# Patient Record
Sex: MALE | Race: WHITE | NOT HISPANIC OR LATINO
[De-identification: names, ages, dates, MRNs, and addresses within clinical notes are randomized per-mention and may not be internally consistent; named-entity substitution may affect disease eponyms.]

---

## 2018-08-06 ENCOUNTER — APPOINTMENT (OUTPATIENT)
Dept: UROLOGY | Facility: CLINIC | Age: 63
End: 2018-08-06
Payer: MEDICAID

## 2018-08-06 VITALS
WEIGHT: 155 LBS | DIASTOLIC BLOOD PRESSURE: 74 MMHG | HEART RATE: 77 BPM | RESPIRATION RATE: 17 BRPM | HEIGHT: 71 IN | TEMPERATURE: 98.1 F | BODY MASS INDEX: 21.7 KG/M2 | SYSTOLIC BLOOD PRESSURE: 108 MMHG

## 2018-08-06 PROCEDURE — 99203 OFFICE O/P NEW LOW 30 MIN: CPT

## 2018-08-10 ENCOUNTER — MESSAGE (OUTPATIENT)
Age: 63
End: 2018-08-10

## 2018-08-10 LAB
PSA FREE FLD-MCNC: 25.9
PSA FREE SERPL-MCNC: 2.37 NG/ML
PSA SERPL-MCNC: 9.15 NG/ML

## 2018-08-29 ENCOUNTER — APPOINTMENT (OUTPATIENT)
Dept: UROLOGY | Facility: CLINIC | Age: 63
End: 2018-08-29
Payer: MEDICAID

## 2018-08-29 ENCOUNTER — OUTPATIENT (OUTPATIENT)
Dept: OUTPATIENT SERVICES | Facility: HOSPITAL | Age: 63
LOS: 1 days | End: 2018-08-29
Payer: MEDICAID

## 2018-08-29 VITALS
HEART RATE: 60 BPM | DIASTOLIC BLOOD PRESSURE: 61 MMHG | RESPIRATION RATE: 17 BRPM | SYSTOLIC BLOOD PRESSURE: 106 MMHG | TEMPERATURE: 98.2 F

## 2018-08-29 DIAGNOSIS — R35.0 FREQUENCY OF MICTURITION: ICD-10-CM

## 2018-08-29 PROCEDURE — 55700: CPT

## 2018-08-29 PROCEDURE — 76942 ECHO GUIDE FOR BIOPSY: CPT | Mod: 59

## 2018-08-29 PROCEDURE — 76872 US TRANSRECTAL: CPT | Mod: 26

## 2018-08-29 PROCEDURE — 76872 US TRANSRECTAL: CPT

## 2018-08-29 PROCEDURE — 76942 ECHO GUIDE FOR BIOPSY: CPT | Mod: 26,59

## 2018-08-29 RX ORDER — CIPROFLOXACIN HYDROCHLORIDE 500 MG/1
500 TABLET, FILM COATED ORAL TWICE DAILY
Qty: 6 | Refills: 0 | Status: COMPLETED | COMMUNITY
Start: 2018-08-10 | End: 2018-08-29

## 2018-08-30 DIAGNOSIS — R97.20 ELEVATED PROSTATE SPECIFIC ANTIGEN [PSA]: ICD-10-CM

## 2018-09-05 ENCOUNTER — APPOINTMENT (OUTPATIENT)
Dept: UROLOGY | Facility: CLINIC | Age: 63
End: 2018-09-05
Payer: MEDICAID

## 2018-09-05 DIAGNOSIS — R97.20 ELEVATED PROSTATE, SPECIFIC ANTIGEN [PSA]: ICD-10-CM

## 2018-09-05 LAB — CORE LAB BIOPSY: NORMAL

## 2018-09-05 PROCEDURE — 99214 OFFICE O/P EST MOD 30 MIN: CPT

## 2019-01-07 ENCOUNTER — APPOINTMENT (OUTPATIENT)
Dept: UROLOGY | Facility: CLINIC | Age: 64
End: 2019-01-07
Payer: MEDICAID

## 2019-01-07 PROCEDURE — 99213 OFFICE O/P EST LOW 20 MIN: CPT

## 2019-01-08 LAB
PSA FREE FLD-MCNC: 16 %
PSA FREE SERPL-MCNC: 0.89 NG/ML
PSA SERPL-MCNC: 5.48 NG/ML

## 2019-04-08 ENCOUNTER — APPOINTMENT (OUTPATIENT)
Dept: UROLOGY | Facility: CLINIC | Age: 64
End: 2019-04-08
Payer: MEDICAID

## 2019-04-08 PROCEDURE — 99214 OFFICE O/P EST MOD 30 MIN: CPT

## 2019-04-08 NOTE — ASSESSMENT
[FreeTextEntry1] : Low risk prostate cancer on active surveillance. \par --PSA today\par --RTC in 3-4mo with PSA and MARK\par --rebiopsy in 12-18mo with MRI prior to rebiopsy

## 2019-04-08 NOTE — PHYSICAL EXAM
[General Appearance - Well Developed] : well developed [General Appearance - Well Nourished] : well nourished [General Appearance - In No Acute Distress] : no acute distress [Edema] : no peripheral edema [Exaggerated Use Of Accessory Muscles For Inspiration] : no accessory muscle use [Normal Station and Gait] : the gait and station were normal for the patient's age [No Focal Deficits] : no focal deficits [Oriented To Time, Place, And Person] : oriented to person, place, and time [FreeTextEntry1] : B ears with skin changes

## 2019-04-08 NOTE — DISEASE MANAGEMENT
[1] : T1 [b] : b [X] : MX [0-10] : 0 -10 ng/mL [6] : [place] Score 6 [I] : I [Active Surveillance] : Active Surveillance [BiopsyDate] : 8/18 [MeasuredProstateVolume] : 23 [TotalCores] : 12 [TotalPositiveCores] : 2 [MaxCoreInvolvement] : 80

## 2019-04-08 NOTE — HISTORY OF PRESENT ILLNESS
[FreeTextEntry1] : 64yo gentleman with cc of prostate ca. Pt originally referred for elevated PSA after recent visit to PCP for annual exam and was found to have elevated PSA at 5.1. He had not seen PCP in many years. In 2014 PSA was 2.8. At baseline, no urinary complaints. Nocturia is 2-3x  but this is not bothersome and not consistent. No hx of UTI. No hematuria. No family hx of prostate ca. Father passed away from thyroid ca. Mom passed away from lung ca. Son with lung ca.  Repeat PSA here was 9.2 and bx recommended. Had bx in 8/2018 that showed 2 cores with prognostic grade 1 prostate ca involving 20% and 80% of the 2 cores. he was placed on active surveillance and returns today for follow-up. \par \par PSA at last visit was 5.48. Today he denies changes. No new issues.

## 2019-04-08 NOTE — DISEASE MANAGEMENT
[1] : T1 [b] : b [X] : MX [0-10] : 0 -10 ng/mL [6] : [place] Score 6 [I] : I [Active Surveillance] : Active Surveillance [BiopsyDate] : 8/18 [MeasuredProstateVolume] : 23 [TotalCores] : 12 [TotalPositiveCores] : 2 [MaxCoreInvolvement] : 80

## 2019-04-11 LAB
PSA FREE FLD-MCNC: 17 %
PSA FREE SERPL-MCNC: 0.98 NG/ML
PSA SERPL-MCNC: 5.7 NG/ML

## 2019-04-23 ENCOUNTER — APPOINTMENT (OUTPATIENT)
Dept: CARDIOLOGY | Facility: CLINIC | Age: 64
End: 2019-04-23
Payer: MEDICAID

## 2019-04-23 ENCOUNTER — NON-APPOINTMENT (OUTPATIENT)
Age: 64
End: 2019-04-23

## 2019-04-23 VITALS
HEART RATE: 66 BPM | HEIGHT: 71 IN | SYSTOLIC BLOOD PRESSURE: 116 MMHG | DIASTOLIC BLOOD PRESSURE: 70 MMHG | WEIGHT: 156 LBS | BODY MASS INDEX: 21.84 KG/M2 | OXYGEN SATURATION: 97 %

## 2019-04-23 DIAGNOSIS — I42.8 OTHER CARDIOMYOPATHIES: ICD-10-CM

## 2019-04-23 DIAGNOSIS — H81.10 BENIGN PAROXYSMAL VERTIGO, UNSPECIFIED EAR: ICD-10-CM

## 2019-04-23 PROCEDURE — 99203 OFFICE O/P NEW LOW 30 MIN: CPT

## 2019-04-23 PROCEDURE — 93000 ELECTROCARDIOGRAM COMPLETE: CPT

## 2019-04-23 NOTE — DISCUSSION/SUMMARY
[Stable] : stable [Non-specific ECG Changes] : abnormal ECG [Echocardiogram] : an echocardiogram [Stress Test Treadmill] : an exercise treadmill test [None] : There are no changes in medication management [Patient] : the patient [With ___] : with [unfilled] [FreeTextEntry1] : Like the patient suffered from benign positional vertigo it is now resolved. No evidence of any active cardiac issues present. The patient otherwise healthy and no other focal complaints noted. If dizziness recurs, suggest ENT evaluation as needed.

## 2019-04-23 NOTE — CARDIOLOGY SUMMARY
[No Exercise Ind Arr] : no exercise induced arrhythmias [No Symptoms] : no Symptoms [___] : [unfilled] [LVEF ___%] : LVEF [unfilled]%

## 2019-04-23 NOTE — REASON FOR VISIT
[Initial Evaluation] : an initial evaluation of [Abnormal ECG] : an abnormal ECG [Cardiomyopathy] : cardiomyopathy

## 2019-04-23 NOTE — PHYSICAL EXAM
[General Appearance - Well Developed] : well developed [Normal Appearance] : normal appearance [Well Groomed] : well groomed [General Appearance - Well Nourished] : well nourished [No Deformities] : no deformities [General Appearance - In No Acute Distress] : no acute distress [Normal Conjunctiva] : the conjunctiva exhibited no abnormalities [Eyelids - No Xanthelasma] : the eyelids demonstrated no xanthelasmas [No Oral Pallor] : no oral pallor [Normal Oral Mucosa] : normal oral mucosa [Normal Jugular Venous A Waves Present] : normal jugular venous A waves present [No Oral Cyanosis] : no oral cyanosis [Normal Jugular Venous V Waves Present] : normal jugular venous V waves present [No Jugular Venous Dai A Waves] : no jugular venous dai A waves [Heart Rate And Rhythm] : heart rate and rhythm were normal [Heart Sounds] : normal S1 and S2 [Murmurs] : no murmurs present [Respiration, Rhythm And Depth] : normal respiratory rhythm and effort [Exaggerated Use Of Accessory Muscles For Inspiration] : no accessory muscle use [Abdomen Soft] : soft [Auscultation Breath Sounds / Voice Sounds] : lungs were clear to auscultation bilaterally [Abdomen Mass (___ Cm)] : no abdominal mass palpated [Abdomen Tenderness] : non-tender [Abnormal Walk] : normal gait [Gait - Sufficient For Exercise Testing] : the gait was sufficient for exercise testing [Cyanosis, Localized] : no localized cyanosis [Nail Clubbing] : no clubbing of the fingernails [Petechial Hemorrhages (___cm)] : no petechial hemorrhages [] : no rash [Skin Color & Pigmentation] : normal skin color and pigmentation [Skin Lesions] : no skin lesions [No Venous Stasis] : no venous stasis [No Skin Ulcers] : no skin ulcer [No Xanthoma] : no  xanthoma was observed [Affect] : the affect was normal [Oriented To Time, Place, And Person] : oriented to person, place, and time [Mood] : the mood was normal [No Anxiety] : not feeling anxious

## 2019-04-23 NOTE — HISTORY OF PRESENT ILLNESS
[FreeTextEntry1] : Generally healthy 63-year-old [facility], [place] male seen 5 years ago for evaluation of EKG abnormalities noted on routine physical examination. Patient was now referred for evaluation of vertigo that occurred several weeks ago but has now abated spontaneously.\par Patient denies any chest discomfort, shortness of breath, palpitations. \par \par He states that he has no exercise intolerance but does not exercise routinely. He works as an . No other significant medical issues were noted.

## 2019-07-08 ENCOUNTER — APPOINTMENT (OUTPATIENT)
Dept: UROLOGY | Facility: CLINIC | Age: 64
End: 2019-07-08
Payer: MEDICAID

## 2019-07-08 PROCEDURE — 99213 OFFICE O/P EST LOW 20 MIN: CPT

## 2019-07-08 NOTE — HISTORY OF PRESENT ILLNESS
[FreeTextEntry1] : 63yo gentleman with cc of prostate ca. Pt originally referred for elevated PSA after recent visit to PCP for annual exam and was found to have elevated PSA at 5.1. He had not seen PCP in many years. In 2014 PSA was 2.8. At baseline, no urinary complaints. Nocturia is 2-3x  but this is not bothersome and not consistent. No hx of UTI. No hematuria. No family hx of prostate ca. Father passed away from thyroid ca. Mom passed away from lung ca. Son with lung ca.  Repeat PSA here was 9.2 and bx recommended. Had bx in 8/2018 that showed 2 cores with prognostic grade 1 prostate ca involving 20% and 80% of the 2 cores. he was placed on active surveillance and returns today for follow-up. \par \par PSA at last visit was 5.7. Today he denies  changes. He reports since last visit, he had melena and hematochezia. He is due to see GI again.

## 2019-07-08 NOTE — REVIEW OF SYSTEMS
[see HPI] : see HPI [Wake up at night to urinate  How many times?  ___] : wakes up to urinate [unfilled] times during the night [Poor quality erections] : Poor quality erections [Negative] : Heme/Lymph

## 2019-07-08 NOTE — PHYSICAL EXAM
[General Appearance - Well Developed] : well developed [General Appearance - Well Nourished] : well nourished [Exaggerated Use Of Accessory Muscles For Inspiration] : no accessory muscle use [General Appearance - In No Acute Distress] : no acute distress [Edema] : no peripheral edema [Normal Station and Gait] : the gait and station were normal for the patient's age [No Focal Deficits] : no focal deficits [Oriented To Time, Place, And Person] : oriented to person, place, and time [FreeTextEntry1] : B ears with skin changes

## 2019-07-08 NOTE — DISEASE MANAGEMENT
[1] : T1 [X] : NX [b] : b [6] : [place] Score 6 [I] : I [Active Surveillance] : Active Surveillance [BiopsyDate] : 8/18 [MeasuredProstateVolume] : 23 [TotalCores] : 12 [TotalPositiveCores] : 2 [MaxCoreInvolvement] : 80

## 2019-07-08 NOTE — ASSESSMENT
[FreeTextEntry1] : Low risk prostate cancer on active surveillance. \par --PSA today\par --MRI eval\par --RTC in 3mo\par --Rebiopsy in next 3-6mo.

## 2019-07-09 LAB
PSA FREE FLD-MCNC: 18 %
PSA FREE SERPL-MCNC: 1.08 NG/ML
PSA SERPL-MCNC: 6.14 NG/ML

## 2019-10-07 ENCOUNTER — FORM ENCOUNTER (OUTPATIENT)
Age: 64
End: 2019-10-07

## 2019-10-08 ENCOUNTER — APPOINTMENT (OUTPATIENT)
Dept: MRI IMAGING | Facility: IMAGING CENTER | Age: 64
End: 2019-10-08
Payer: MEDICAID

## 2019-10-08 ENCOUNTER — OUTPATIENT (OUTPATIENT)
Dept: OUTPATIENT SERVICES | Facility: HOSPITAL | Age: 64
LOS: 1 days | End: 2019-10-08
Payer: MEDICAID

## 2019-10-08 DIAGNOSIS — C61 MALIGNANT NEOPLASM OF PROSTATE: ICD-10-CM

## 2019-10-08 PROCEDURE — 72197 MRI PELVIS W/O & W/DYE: CPT | Mod: 26

## 2019-10-08 PROCEDURE — A9585: CPT

## 2019-10-08 PROCEDURE — 72197 MRI PELVIS W/O & W/DYE: CPT

## 2019-10-14 ENCOUNTER — APPOINTMENT (OUTPATIENT)
Dept: UROLOGY | Facility: CLINIC | Age: 64
End: 2019-10-14
Payer: MEDICAID

## 2019-10-14 PROCEDURE — 99213 OFFICE O/P EST LOW 20 MIN: CPT

## 2019-10-14 NOTE — DISEASE MANAGEMENT
[1] : T1 [b] : b [X] : MX [6] : [place] Score 6 [I] : I [Active Surveillance] : Active Surveillance [BiopsyDate] : 8/18 [MeasuredProstateVolume] : 23 [TotalCores] : 12 [MaxCoreInvolvement] : 80 [TotalPositiveCores] : 2

## 2019-10-14 NOTE — PHYSICAL EXAM
[General Appearance - Well Developed] : well developed [General Appearance - Well Nourished] : well nourished [General Appearance - In No Acute Distress] : no acute distress [Edema] : no peripheral edema [Exaggerated Use Of Accessory Muscles For Inspiration] : no accessory muscle use [Normal Station and Gait] : the gait and station were normal for the patient's age [Oriented To Time, Place, And Person] : oriented to person, place, and time [No Focal Deficits] : no focal deficits [FreeTextEntry1] : B ears with skin changes

## 2019-10-14 NOTE — HISTORY OF PRESENT ILLNESS
[FreeTextEntry1] : 63yo gentleman with cc of prostate ca. Pt originally referred for elevated PSA after recent visit to PCP for annual exam and was found to have elevated PSA at 5.1. He had not seen PCP in many years. In 2014 PSA was 2.8. At baseline, no urinary complaints. Nocturia is 2-3x  but this is not bothersome and not consistent. No hx of UTI. No hematuria. No family hx of prostate ca. Father passed away from thyroid ca. Mom passed away from lung ca. Son with lung ca.  Repeat PSA here was 9.2 and bx recommended. Had bx in 8/2018 that showed 2 cores with prognostic grade 1 prostate ca involving 20% and 80% of the 2 cores. he was placed on active surveillance and returns today for follow-up. \par \par PSA at last visit was 6.1, up from 5.7. He underwent MRI for eval that showed 31cc gland with 2 PiRAD 3 lesions.

## 2019-10-14 NOTE — ASSESSMENT
[FreeTextEntry1] : Low risk prostate cancer on active surveillance. Discussed risks of prostate biopsy including but not limited to bleeding, infection and even sepsis. Risk is significantly lower (perhaps 0) via transperineal approach which we will be using. Pt is not on any blood thinners. Will schedule in near future for biopsy. \par --Biopsy instructions and precautions given\par --PSA today\par --MRI TP fusion biopsy

## 2019-10-17 LAB
PSA FREE FLD-MCNC: 17 %
PSA FREE SERPL-MCNC: 1.11 NG/ML
PSA SERPL-MCNC: 6.4 NG/ML

## 2019-10-28 ENCOUNTER — APPOINTMENT (OUTPATIENT)
Dept: UROLOGY | Facility: CLINIC | Age: 64
End: 2019-10-28
Payer: MEDICAID

## 2019-10-28 ENCOUNTER — LABORATORY RESULT (OUTPATIENT)
Age: 64
End: 2019-10-28

## 2019-10-28 ENCOUNTER — OUTPATIENT (OUTPATIENT)
Dept: OUTPATIENT SERVICES | Facility: HOSPITAL | Age: 64
LOS: 1 days | End: 2019-10-28
Payer: MEDICAID

## 2019-10-28 VITALS
TEMPERATURE: 98.2 F | SYSTOLIC BLOOD PRESSURE: 126 MMHG | HEIGHT: 71 IN | HEART RATE: 65 BPM | WEIGHT: 156 LBS | RESPIRATION RATE: 16 BRPM | DIASTOLIC BLOOD PRESSURE: 73 MMHG | BODY MASS INDEX: 21.84 KG/M2

## 2019-10-28 DIAGNOSIS — R35.0 FREQUENCY OF MICTURITION: ICD-10-CM

## 2019-10-28 PROCEDURE — 76942 ECHO GUIDE FOR BIOPSY: CPT | Mod: 26,59

## 2019-10-28 PROCEDURE — 76377 3D RENDER W/INTRP POSTPROCES: CPT | Mod: 26

## 2019-10-28 PROCEDURE — 76872 US TRANSRECTAL: CPT | Mod: 26

## 2019-10-28 PROCEDURE — 76872 US TRANSRECTAL: CPT

## 2019-10-28 PROCEDURE — 55700: CPT | Mod: 22

## 2019-10-28 PROCEDURE — 76942 ECHO GUIDE FOR BIOPSY: CPT | Mod: 59

## 2019-10-28 PROCEDURE — 55700: CPT

## 2019-11-01 DIAGNOSIS — C61 MALIGNANT NEOPLASM OF PROSTATE: ICD-10-CM

## 2019-11-05 ENCOUNTER — APPOINTMENT (OUTPATIENT)
Dept: UROLOGY | Facility: CLINIC | Age: 64
End: 2019-11-05
Payer: MEDICAID

## 2019-11-05 VITALS
BODY MASS INDEX: 21.84 KG/M2 | TEMPERATURE: 98 F | SYSTOLIC BLOOD PRESSURE: 118 MMHG | HEIGHT: 71 IN | HEART RATE: 61 BPM | RESPIRATION RATE: 16 BRPM | DIASTOLIC BLOOD PRESSURE: 69 MMHG | WEIGHT: 156 LBS

## 2019-11-05 PROCEDURE — 99214 OFFICE O/P EST MOD 30 MIN: CPT

## 2019-11-05 NOTE — ASSESSMENT
[FreeTextEntry1] : Probable post bx hematoma. Possible abscess but no warmth, no fevers, and no skin changes. Would not intervene at this point. \par --CBC \par --IBU and tylenol for pain\par --RTC Friday for repeat exam\par \par Low risk prostate cancer on active surveillance now with upstaging on fusion bx. Discussed diagnosis with the patient. Discussed that prostate cancer is generally thought of as a cancer with slow progression. As such, there are many men that although they are found to have prostate cancer, it may never result in any consequences/sequelae in their lifetime. With upstaging on recent bx and minimal comorbidities, favor treatment. Options for management include surgery or radiation. \par --Refer to prostate ca clinic for Uro/RT eval

## 2019-11-05 NOTE — PHYSICAL EXAM
[General Appearance - Well Developed] : well developed [General Appearance - Well Nourished] : well nourished [General Appearance - In No Acute Distress] : no acute distress [Edema] : no peripheral edema [Exaggerated Use Of Accessory Muscles For Inspiration] : no accessory muscle use [Oriented To Time, Place, And Person] : oriented to person, place, and time [Normal Station and Gait] : the gait and station were normal for the patient's age [No Focal Deficits] : no focal deficits [FreeTextEntry1] : B ears with skin changes, see  exam

## 2019-11-05 NOTE — DISEASE MANAGEMENT
[1] : T1 [b] : b [X] : MX [6] : [place] Score 6 [I] : I [Active Surveillance] : Active Surveillance [BiopsyDate] : 8/18 [MeasuredProstateVolume] : 23 [TotalCores] : 12 [TotalPositiveCores] : 2 [MaxCoreInvolvement] : 80 [LastMRIDate] : 10/19 [LastMRIResults] : 2 PiRAD 3 lesions [FirstSurveillanceBiopsyDate] : 10/19 [FirstSurveillanceBiopsyResults] : Grade 2 in Target #1

## 2019-11-05 NOTE — HISTORY OF PRESENT ILLNESS
[FreeTextEntry1] : 63yo gentleman with cc of prostate ca. Pt originally referred for elevated PSA after recent visit to PCP for annual exam and was found to have elevated PSA at 5.1. He had not seen PCP in many years. In 2014 PSA was 2.8. At baseline, no urinary complaints. Nocturia is 2-3x  but this is not bothersome and not consistent. No hx of UTI. No hematuria. No family hx of prostate ca. Father passed away from thyroid ca. Mom passed away from lung ca. Son with lung ca.  Repeat PSA here was 9.2 and bx recommended. Had bx in 8/2018 that showed 2 cores with prognostic grade 1 prostate ca involving 20% and 80% of the 2 cores. he was placed on active surveillance. \par \par PSA at last visit was 6.1, up from 5.7. He underwent MRI for eval that showed 31cc gland with 2 PiRAD 3 lesions. He underwent bx last week. He comes in today with c/o pain in perineum. Has had since biopsy. Pain is intermittent and sharp. No fevers. No change in urinary sx.

## 2019-11-08 ENCOUNTER — APPOINTMENT (OUTPATIENT)
Dept: UROLOGY | Facility: CLINIC | Age: 64
End: 2019-11-08
Payer: MEDICAID

## 2019-11-08 PROCEDURE — 99214 OFFICE O/P EST MOD 30 MIN: CPT

## 2019-11-08 NOTE — PHYSICAL EXAM
[General Appearance - Well Developed] : well developed [General Appearance - Well Nourished] : well nourished [General Appearance - In No Acute Distress] : no acute distress [Exaggerated Use Of Accessory Muscles For Inspiration] : no accessory muscle use [Edema] : no peripheral edema [FreeTextEntry1] : B ears with skin changes, see  exam [Oriented To Time, Place, And Person] : oriented to person, place, and time [Normal Station and Gait] : the gait and station were normal for the patient's age [No Focal Deficits] : no focal deficits

## 2019-11-08 NOTE — HISTORY OF PRESENT ILLNESS
[FreeTextEntry1] : 63yo gentleman with cc of prostate ca. Pt originally referred for elevated PSA after recent visit to PCP for annual exam and was found to have elevated PSA at 5.1. He had not seen PCP in many years. In 2014 PSA was 2.8. At baseline, no urinary complaints. Nocturia is 2-3x  but this is not bothersome and not consistent. No hx of UTI. No hematuria. No family hx of prostate ca. Father passed away from thyroid ca. Mom passed away from lung ca. Son with lung ca.  Repeat PSA here was 9.2 and bx recommended. Had bx in 8/2018 that showed 2 cores with prognostic grade 1 prostate ca involving 20% and 80% of the 2 cores. he was placed on active surveillance. \par \par PSA at last visit was 6.1, up from 5.7. He underwent MRI for eval that showed 31cc gland with 2 PiRAD 3 lesions. He underwent bx last week. He came in 2d ago with c/o pain in perineum. Has had since biopsy. Pain is intermittent and sharp. No fevers. No change in urinary sx. He was started on antiinflammatories and warm compress. Sx have improved. SMall clear drainage now.

## 2019-11-08 NOTE — DISEASE MANAGEMENT
[1] : T1 [b] : b [X] : MX [6] : [place] Score 6 [BiopsyDate] : 8/18 [MeasuredProstateVolume] : 23 [TotalCores] : 12 [TotalPositiveCores] : 2 [MaxCoreInvolvement] : 80 [I] : I [Active Surveillance] : Active Surveillance [LastMRIDate] : 10/19 [LastMRIResults] : 2 PiRAD 3 lesions [FirstSurveillanceBiopsyDate] : 10/19 [FirstSurveillanceBiopsyResults] : Grade 2 in Target #1

## 2019-11-08 NOTE — ASSESSMENT
[FreeTextEntry1] : Probable post bx hematoma. Possible abscess but no warmth, no fevers, and no skin changes. Would not intervene at this point. \par --CBC \par --IBU and tylenol for pain\par --Keflex x5d\par \par Low risk prostate cancer on active surveillance now with upstaging on fusion bx. Discussed diagnosis with the patient. Discussed that prostate cancer is generally thought of as a cancer with slow progression. As such, there are many men that although they are found to have prostate cancer, it may never result in any consequences/sequelae in their lifetime. With upstaging on recent bx and minimal comorbidities, favor treatment. Options for management include surgery or radiation. \par --Refer to prostate ca clinic for Uro/RT eval

## 2020-01-16 ENCOUNTER — APPOINTMENT (OUTPATIENT)
Dept: MULTI SPECIALTY CLINIC | Facility: CLINIC | Age: 65
End: 2020-01-16
Payer: MEDICAID

## 2020-01-16 ENCOUNTER — APPOINTMENT (OUTPATIENT)
Dept: MULTI SPECIALTY CLINIC | Facility: CLINIC | Age: 65
End: 2020-01-16

## 2020-01-16 DIAGNOSIS — Z80.8 FAMILY HISTORY OF MALIGNANT NEOPLASM OF OTHER ORGANS OR SYSTEMS: ICD-10-CM

## 2020-01-16 DIAGNOSIS — Z80.1 FAMILY HISTORY OF MALIGNANT NEOPLASM OF TRACHEA, BRONCHUS AND LUNG: ICD-10-CM

## 2020-01-16 PROCEDURE — 99215 OFFICE O/P EST HI 40 MIN: CPT

## 2020-01-16 PROCEDURE — 99204 OFFICE O/P NEW MOD 45 MIN: CPT | Mod: 25

## 2020-01-16 RX ORDER — IBUPROFEN 600 MG/1
600 TABLET, FILM COATED ORAL 4 TIMES DAILY
Qty: 28 | Refills: 0 | Status: COMPLETED | COMMUNITY
Start: 2019-11-05 | End: 2020-01-16

## 2020-01-16 RX ORDER — CEPHALEXIN 500 MG/1
500 CAPSULE ORAL TWICE DAILY
Qty: 10 | Refills: 0 | Status: COMPLETED | COMMUNITY
Start: 2019-11-08 | End: 2020-01-16

## 2020-01-16 RX ORDER — DIAZEPAM 5 MG/1
5 TABLET ORAL
Qty: 1 | Refills: 0 | Status: COMPLETED | COMMUNITY
Start: 2019-10-14 | End: 2020-01-16

## 2020-01-16 RX ORDER — ACETAMINOPHEN 500 MG/1
500 TABLET ORAL
Qty: 66 | Refills: 0 | Status: COMPLETED | COMMUNITY
Start: 2019-11-05 | End: 2020-01-16

## 2020-01-16 NOTE — REVIEW OF SYSTEMS
[Nocturia] : nocturia [Urinary Frequency] : urinary frequency [IPSS Score (0-40): ___] : IPSS score: [unfilled] [EPIC-CP Score (0-60): ___] : EPIC-CP score: [unfilled] [Negative] : Endocrine [Hematuria: Grade 0] : Hematuria: Grade 0 [Urinary Incontinence: Grade 0] : Urinary Incontinence: Grade 0  [Urinary Retention: Grade 0] : Urinary Retention: Grade 0 [Urinary Tract Pain: Grade 0] : Urinary Tract Pain: Grade 0 [Urinary Urgency: Grade 0] : Urinary Urgency: Grade 0 [Urinary Frequency: Grade 0] : Urinary Frequency: Grade 0 [Ejaculation Disorder: Grade 0] : Ejaculation Disorder: Grade 0 [Erectile Dysfunction: Grade 1 - Decrease in erectile function (frequency or rigidity of erections) but intervention not indicated (e.g., medication or use of mechanical device, penile pump)] : Erectile Dysfunction: Grade 1 - Decrease in erectile function (frequency or rigidity of erections) but intervention not indicated (e.g., medication or use of mechanical device, penile pump)

## 2020-01-16 NOTE — DISEASE MANAGEMENT
[1] : T1 [b] : b [0] : M0 [0-10] : 0 -10 ng/mL [7(3+4)] : [place] Score 7(3+4) [3] : 3 [] : Patient had a Prostate MRI [IIB] : IIB [BiopsyDate] : 10/28/19 [MeasuredProstateVolume] : 38 cc [TotalCores] : 14 [TotalPositiveCores] : 3 [MaxCoreInvolvement] : 15%

## 2020-01-16 NOTE — HISTORY OF PRESENT ILLNESS
[FreeTextEntry1] : Mr. Torres is a 64 years old male with Meridian 3+4=7 adenocarcinoma of the prostate. He was referred by Dr. Diaz who had been following patient for elevated PSA and active surveillance since 2018.  Patient was initially diagnosed in 2018 with biopsy proven [place] score 3+3=6 prostate cancer in 2 cores involving 80% and 20% of the two cores and PSA of 9.2 ng/ml.\par \par PSA:\par 10/14/19    -  6.40\par 7/8/19        -  6.14\par 4/8/19        -  5.70\par 1/7/19        -  5.48\par 8/6/18        -  9.15\par \par MRI of prostate on10/8/19 showed prostate volume of 31 cc and subcentimeter suspicious lesions in left peripheral zone. Prostate capsule was intact. PIRADS 3.\par \par A repeat biopsy of prostate on 10/28/19 indicated 3 out of 14 cores positive for cancer with Meridian score 3+4=7 in one core and the rest of two cores was Meridian score 3+3=6 involving 5% - 15% of the tissue. Prostate volume was 38 cc. He developed post biopsy probable hematoma for which Keflex was given for 5 days and Ibuprofen for pain with warm compress. He did not develop fever or chills.\par \par Patient presents here today to discuss his treatment options. He denies dysuria, nocturia, frequency, hematuria, urgency and hesitancy. Has regular daily bowel function and he is not sexually active.\par \par

## 2020-01-22 NOTE — HISTORY OF PRESENT ILLNESS
[FreeTextEntry1] : Initial diagnosis in 2018.  Repeat biopsy in Oct 2019 with Ladonna 7 (3+4) prostate cancer.\par Here for treatment discussion. \par No significant urinary complaints.\par Mild erectile dysfunction.\par \par MRI 10/2019: 31 mL volume, PIRAD 3 lesion x 2, left peripheral zone.

## 2020-01-22 NOTE — DISEASE MANAGEMENT
[b] : b [1] : T1 [X] : NX [6] : [place] Score 6 [I] : I [Active Surveillance] : Active Surveillance [BiopsyDate] : 8/18 [MeasuredProstateVolume] : 23 [TotalCores] : 12 [TotalPositiveCores] : 2 [LastMRIDate] : 10/19 [MaxCoreInvolvement] : 80 [LastMRIResults] : 2 PiRAD 3 lesions [FirstSurveillanceBiopsyDate] : 10/19 [FirstSurveillanceBiopsyResults] : Grade 2 in Target #1

## 2020-04-21 ENCOUNTER — APPOINTMENT (OUTPATIENT)
Dept: UROLOGY | Facility: CLINIC | Age: 65
End: 2020-04-21

## 2020-06-10 ENCOUNTER — APPOINTMENT (OUTPATIENT)
Dept: UROLOGY | Facility: CLINIC | Age: 65
End: 2020-06-10

## 2020-06-10 ENCOUNTER — OUTPATIENT (OUTPATIENT)
Dept: OUTPATIENT SERVICES | Facility: HOSPITAL | Age: 65
LOS: 1 days | End: 2020-06-10
Payer: MEDICAID

## 2020-06-10 ENCOUNTER — APPOINTMENT (OUTPATIENT)
Dept: UROLOGY | Facility: CLINIC | Age: 65
End: 2020-06-10
Payer: MEDICAID

## 2020-06-10 VITALS — TEMPERATURE: 98.2 F

## 2020-06-10 DIAGNOSIS — Z85.46 PERSONAL HISTORY OF MALIGNANT NEOPLASM OF PROSTATE: ICD-10-CM

## 2020-06-10 PROCEDURE — 76870 US EXAM SCROTUM: CPT

## 2020-06-10 PROCEDURE — 76870 US EXAM SCROTUM: CPT | Mod: 26

## 2020-06-10 PROCEDURE — 99214 OFFICE O/P EST MOD 30 MIN: CPT | Mod: 25

## 2020-06-10 NOTE — LETTER BODY
[Dear  ___] : Dear  [unfilled], [Courtesy Letter:] : I had the pleasure of seeing your patient, [unfilled], in my office today. [Please see my note below.] : Please see my note below. [Consult Closing:] : Thank you very much for allowing me to participate in the care of this patient.  If you have any questions, please do not hesitate to contact me. [Sincerely,] : Sincerely, [FreeTextEntry3] : Juan Alberto Valiente MD\par System Chief of Urology, [facility]\par  of Urology\par [facility]\par [facility] \par [address] [place] [address], [address] \[place] [address]\par

## 2020-06-10 NOTE — ASSESSMENT
[FreeTextEntry1] : Epididymitis: resolving. Finish course of abx\par \par Prostate CA: Again reviewed surgical procedure including potential risks of stress incontinence and/or erectile dysfunction.  All questions answered.\par \par PSA today.\par \par Will call to schedule surgery.

## 2020-06-10 NOTE — HISTORY OF PRESENT ILLNESS
[FreeTextEntry1] : Pt is here right Scrotal pain that started 5 days ago. Pt initially was seen in urgent care and was prescribed abx. Reports pain has improved. Scrotal US in office neg. Denies any associated urinary complaints. \par \par Prostate CA. Biopsy on 10/19 showed gaudencio 3+4=7 ( Grade 2) on targeted lesion PIRADS 3 on MRI\par Previous biopsy on 8/18 had [place] 3+3=6 Max % Core Involvement: 80 ( was on active surveillance)\par Here for treatment discussion.

## 2020-06-10 NOTE — PHYSICAL EXAM
[General Appearance - Well Developed] : well developed [General Appearance - Well Nourished] : well nourished [Normal Appearance] : normal appearance [Well Groomed] : well groomed [General Appearance - In No Acute Distress] : no acute distress [Abdomen Soft] : soft [Urethral Meatus] : meatus normal [Penis Abnormality] : normal uncircumcised penis [Scrotum] : the scrotum was normal [Testes Mass (___cm)] : there were no testicular masses [Testes Tenderness] : no tenderness of the testes [Edema] : no peripheral edema [] : no respiratory distress [Exaggerated Use Of Accessory Muscles For Inspiration] : no accessory muscle use [Respiration, Rhythm And Depth] : normal respiratory rhythm and effort [Oriented To Time, Place, And Person] : oriented to person, place, and time [Mood] : the mood was normal [Affect] : the affect was normal [Normal Station and Gait] : the gait and station were normal for the patient's age [Not Anxious] : not anxious [FreeTextEntry1] : right sensitive epididymis

## 2020-06-11 LAB — PSA SERPL-MCNC: 6.35 NG/ML

## 2020-06-15 DIAGNOSIS — C61 MALIGNANT NEOPLASM OF PROSTATE: ICD-10-CM

## 2020-07-10 ENCOUNTER — OUTPATIENT (OUTPATIENT)
Dept: OUTPATIENT SERVICES | Facility: HOSPITAL | Age: 65
LOS: 1 days | End: 2020-07-10
Payer: MEDICAID

## 2020-07-10 VITALS
HEART RATE: 65 BPM | OXYGEN SATURATION: 99 % | WEIGHT: 151.9 LBS | HEIGHT: 70 IN | SYSTOLIC BLOOD PRESSURE: 110 MMHG | RESPIRATION RATE: 16 BRPM | TEMPERATURE: 98 F | DIASTOLIC BLOOD PRESSURE: 60 MMHG

## 2020-07-10 DIAGNOSIS — C61 MALIGNANT NEOPLASM OF PROSTATE: ICD-10-CM

## 2020-07-10 DIAGNOSIS — Z90.49 ACQUIRED ABSENCE OF OTHER SPECIFIED PARTS OF DIGESTIVE TRACT: Chronic | ICD-10-CM

## 2020-07-10 LAB
ANION GAP SERPL CALC-SCNC: 11 MMO/L — SIGNIFICANT CHANGE UP (ref 7–14)
BLD GP AB SCN SERPL QL: NEGATIVE — SIGNIFICANT CHANGE UP
BUN SERPL-MCNC: 10 MG/DL — SIGNIFICANT CHANGE UP (ref 7–23)
CALCIUM SERPL-MCNC: 9.2 MG/DL — SIGNIFICANT CHANGE UP (ref 8.4–10.5)
CHLORIDE SERPL-SCNC: 105 MMOL/L — SIGNIFICANT CHANGE UP (ref 98–107)
CO2 SERPL-SCNC: 24 MMOL/L — SIGNIFICANT CHANGE UP (ref 22–31)
CREAT SERPL-MCNC: 1.21 MG/DL — SIGNIFICANT CHANGE UP (ref 0.5–1.3)
GLUCOSE SERPL-MCNC: 74 MG/DL — SIGNIFICANT CHANGE UP (ref 70–99)
HCT VFR BLD CALC: 39.7 % — SIGNIFICANT CHANGE UP (ref 39–50)
HGB BLD-MCNC: 12.8 G/DL — LOW (ref 13–17)
MCHC RBC-ENTMCNC: 28.3 PG — SIGNIFICANT CHANGE UP (ref 27–34)
MCHC RBC-ENTMCNC: 32.2 % — SIGNIFICANT CHANGE UP (ref 32–36)
MCV RBC AUTO: 87.8 FL — SIGNIFICANT CHANGE UP (ref 80–100)
NRBC # FLD: 0 K/UL — SIGNIFICANT CHANGE UP (ref 0–0)
PLATELET # BLD AUTO: 231 K/UL — SIGNIFICANT CHANGE UP (ref 150–400)
PMV BLD: 11.3 FL — SIGNIFICANT CHANGE UP (ref 7–13)
POTASSIUM SERPL-MCNC: 3.8 MMOL/L — SIGNIFICANT CHANGE UP (ref 3.5–5.3)
POTASSIUM SERPL-SCNC: 3.8 MMOL/L — SIGNIFICANT CHANGE UP (ref 3.5–5.3)
RBC # BLD: 4.52 M/UL — SIGNIFICANT CHANGE UP (ref 4.2–5.8)
RBC # FLD: 16.5 % — HIGH (ref 10.3–14.5)
RH IG SCN BLD-IMP: POSITIVE — SIGNIFICANT CHANGE UP
SODIUM SERPL-SCNC: 140 MMOL/L — SIGNIFICANT CHANGE UP (ref 135–145)
WBC # BLD: 10.22 K/UL — SIGNIFICANT CHANGE UP (ref 3.8–10.5)
WBC # FLD AUTO: 10.22 K/UL — SIGNIFICANT CHANGE UP (ref 3.8–10.5)

## 2020-07-10 PROCEDURE — 93010 ELECTROCARDIOGRAM REPORT: CPT

## 2020-07-10 NOTE — H&P PST ADULT - NEGATIVE NEUROLOGICAL SYMPTOMS
no difficulty walking/no paresthesias/no weakness/no generalized seizures/no vertigo/no tremors/no loss of sensation/no syncope

## 2020-07-10 NOTE — H&P PST ADULT - NSANTHBPHIGHRD_ENT_A_CORE
O-Z Plasty Text: The defect edges were debeveled with a #15 scalpel blade.  Given the location of the defect, shape of the defect and the proximity to free margins an O-Z plasty (double transposition flap) was deemed most appropriate.  Using a sterile surgical marker, the appropriate transposition flaps were drawn incorporating the defect and placing the expected incisions within the relaxed skin tension lines where possible.    The area thus outlined was incised deep to adipose tissue with a #15 scalpel blade.  The skin margins were undermined to an appropriate distance in all directions utilizing iris scissors.  Hemostasis was achieved with electrocautery.  The flaps were then transposed into place, one clockwise and the other counterclockwise, and anchored with interrupted buried subcutaneous sutures. No

## 2020-07-10 NOTE — H&P PST ADULT - NSICDXPROBLEM_GEN_ALL_CORE_FT
PROBLEM DIAGNOSES  Problem: Malignant neoplasm of prostate  Assessment and Plan: Scheduled for Robotic assisted Laparoscopic radical prostatectomy on 07/17/20. Pre op instructions, famotidine given and explained. Pt verbalized understanding.   Covid-19 swab schedule for 07/14/20 at 8:15 A.M at [address]

## 2020-07-10 NOTE — H&P PST ADULT - RS GEN PE MLT RESP DETAILS PC
good air movement/breath sounds equal/airway patent/no wheezes/clear to auscultation bilaterally/no rales/no intercostal retractions/no chest wall tenderness/no rhonchi/respirations non-labored

## 2020-07-10 NOTE — H&P PST ADULT - NEGATIVE OPHTHALMOLOGIC SYMPTOMS
no irritation R/no pain R/no pain L/no loss of vision R/no blurred vision L/no discharge R/no blurred vision R/no irritation L/no lacrimation L/no discharge L/no diplopia/no loss of vision L/no photophobia/no lacrimation R

## 2020-07-10 NOTE — H&P PST ADULT - NEGATIVE CARDIOVASCULAR SYMPTOMS
no palpitations/no chest pain/no claudication/no peripheral edema/no dyspnea on exertion/no paroxysmal nocturnal dyspnea no palpitations/no paroxysmal nocturnal dyspnea/no claudication/no orthopnea/no peripheral edema/no chest pain/no dyspnea on exertion

## 2020-07-10 NOTE — H&P PST ADULT - HISTORY OF PRESENT ILLNESS
64 y/o Black male presents to PST for pre op evaluation with h/o elevated PSA in 2018. Pt has been following up with urologist. S/P biopsy of prostate. Pre op diagnosis: 64 y/o Black male presents to PST for pre op evaluation with h/o elevated PSA in 2018. Pt has been following up with urologist. S/P biopsy of prostate. Pre op diagnosis: malignant neoplasm of prostate. Now scheduled for robotic assisted laparoscopic radical prostatectomy on 07/17/2020

## 2020-07-10 NOTE — H&P PST ADULT - NSANTHOSAYNRD_GEN_A_CORE
No. SIMON screening performed.  STOP BANG Legend: 0-2 = LOW Risk; 3-4 = INTERMEDIATE Risk; 5-8 = HIGH Risk

## 2020-07-10 NOTE — H&P PST ADULT - NEGATIVE GENERAL GENITOURINARY SYMPTOMS
no flank pain L/no flank pain R/no dysuria/no bladder infections/no hematuria/no urine discoloration/no renal colic

## 2020-07-11 LAB
CULTURE RESULTS: NO GROWTH — SIGNIFICANT CHANGE UP
SPECIMEN SOURCE: SIGNIFICANT CHANGE UP

## 2020-07-14 ENCOUNTER — APPOINTMENT (OUTPATIENT)
Dept: DISASTER EMERGENCY | Facility: CLINIC | Age: 65
End: 2020-07-14

## 2020-07-14 ENCOUNTER — NON-APPOINTMENT (OUTPATIENT)
Age: 65
End: 2020-07-14

## 2020-07-14 ENCOUNTER — APPOINTMENT (OUTPATIENT)
Dept: CARDIOLOGY | Facility: CLINIC | Age: 65
End: 2020-07-14
Payer: MEDICAID

## 2020-07-14 VITALS
WEIGHT: 140 LBS | DIASTOLIC BLOOD PRESSURE: 70 MMHG | SYSTOLIC BLOOD PRESSURE: 110 MMHG | HEART RATE: 66 BPM | TEMPERATURE: 97.9 F | HEIGHT: 71 IN | OXYGEN SATURATION: 99 % | BODY MASS INDEX: 19.6 KG/M2

## 2020-07-14 LAB — SARS-COV-2 N GENE NPH QL NAA+PROBE: NOT DETECTED

## 2020-07-14 PROCEDURE — 93000 ELECTROCARDIOGRAM COMPLETE: CPT

## 2020-07-14 PROCEDURE — 99213 OFFICE O/P EST LOW 20 MIN: CPT

## 2020-07-14 NOTE — PHYSICAL EXAM
[General Appearance - Well Developed] : well developed [Normal Appearance] : normal appearance [Well Groomed] : well groomed [No Deformities] : no deformities [General Appearance - Well Nourished] : well nourished [General Appearance - In No Acute Distress] : no acute distress [Normal Conjunctiva] : the conjunctiva exhibited no abnormalities [Normal Oral Mucosa] : normal oral mucosa [Eyelids - No Xanthelasma] : the eyelids demonstrated no xanthelasmas [No Oral Pallor] : no oral pallor [No Oral Cyanosis] : no oral cyanosis [Normal Jugular Venous A Waves Present] : normal jugular venous A waves present [Normal Jugular Venous V Waves Present] : normal jugular venous V waves present [No Jugular Venous Dai A Waves] : no jugular venous dai A waves [Respiration, Rhythm And Depth] : normal respiratory rhythm and effort [Exaggerated Use Of Accessory Muscles For Inspiration] : no accessory muscle use [Auscultation Breath Sounds / Voice Sounds] : lungs were clear to auscultation bilaterally [Heart Rate And Rhythm] : heart rate and rhythm were normal [Heart Sounds] : normal S1 and S2 [Murmurs] : no murmurs present [Abdomen Soft] : soft [Abdomen Tenderness] : non-tender [Abdomen Mass (___ Cm)] : no abdominal mass palpated [Abnormal Walk] : normal gait [Nail Clubbing] : no clubbing of the fingernails [Gait - Sufficient For Exercise Testing] : the gait was sufficient for exercise testing [Cyanosis, Localized] : no localized cyanosis [Petechial Hemorrhages (___cm)] : no petechial hemorrhages [Skin Color & Pigmentation] : normal skin color and pigmentation [] : no rash [No Venous Stasis] : no venous stasis [No Skin Ulcers] : no skin ulcer [Skin Lesions] : no skin lesions [No Xanthoma] : no  xanthoma was observed [Oriented To Time, Place, And Person] : oriented to person, place, and time [Affect] : the affect was normal [Mood] : the mood was normal [No Anxiety] : not feeling anxious

## 2020-07-16 ENCOUNTER — TRANSCRIPTION ENCOUNTER (OUTPATIENT)
Age: 65
End: 2020-07-16

## 2020-07-16 NOTE — DISCUSSION/SUMMARY
[Procedure Low Risk] : the procedure risk is low [Patient Low Risk] : the patient is a low surgical risk [Optimized for Surgery] : the patient is optimized for surgery [Continue] : Continue medications as currently directed [As per surgery] : as per surgery [With PCP] : with ~his/her~ primary care provider [Patient] : the patient [FreeTextEntry1] : Labs done 7/10/20 were reviewed and aside from mild anemia which appears chronic, were otherwise normal.\par \par ECG remains essentially unchanged.  Patient otherwise asymptomatic from cardiac standpoint and is never had any significant cardiac risk factors documented.  No indication for further cardiac testing at this time.  Patient to follow-up as needed for routine cardiac surveillance.

## 2020-07-16 NOTE — HISTORY OF PRESENT ILLNESS
[Preoperative Visit] : for a medical evaluation prior to surgery [Scheduled Procedure ___] : a [unfilled] [Surgeon Name ___] : surgeon: [unfilled] [FreeTextEntry1] : 65-year-old [facility] male with h/o EKG abnormalities noted on routine physical examination.\par Patient denies any chest discomfort, shortness of breath, palpitations. Was hospitalized in June, 2019 with GI bleed, no further recent bleed. Seen by Dr. Jacinto in followup.\par \par He states that he has no exercise intolerance but does not exercise routinely. He works as an . No other significant medical issues were noted.

## 2020-07-17 ENCOUNTER — APPOINTMENT (OUTPATIENT)
Dept: UROLOGY | Facility: HOSPITAL | Age: 65
End: 2020-07-17

## 2020-07-17 ENCOUNTER — RESULT REVIEW (OUTPATIENT)
Age: 65
End: 2020-07-17

## 2020-07-17 ENCOUNTER — INPATIENT (INPATIENT)
Facility: HOSPITAL | Age: 65
LOS: 1 days | Discharge: HOME | End: 2020-07-19
Attending: UROLOGY | Admitting: UROLOGY
Payer: MEDICAID

## 2020-07-17 VITALS
SYSTOLIC BLOOD PRESSURE: 104 MMHG | RESPIRATION RATE: 15 BRPM | HEIGHT: 71 IN | WEIGHT: 149.03 LBS | DIASTOLIC BLOOD PRESSURE: 66 MMHG | TEMPERATURE: 98 F | OXYGEN SATURATION: 100 % | HEART RATE: 59 BPM

## 2020-07-17 DIAGNOSIS — C61 MALIGNANT NEOPLASM OF PROSTATE: ICD-10-CM

## 2020-07-17 DIAGNOSIS — Z90.49 ACQUIRED ABSENCE OF OTHER SPECIFIED PARTS OF DIGESTIVE TRACT: Chronic | ICD-10-CM

## 2020-07-17 LAB — RH IG SCN BLD-IMP: POSITIVE — SIGNIFICANT CHANGE UP

## 2020-07-17 PROCEDURE — 88309 TISSUE EXAM BY PATHOLOGIST: CPT | Mod: 26

## 2020-07-17 PROCEDURE — 55866 LAPS SURG PRST8ECT RPBIC RAD: CPT

## 2020-07-17 RX ORDER — OXYCODONE HYDROCHLORIDE 5 MG/1
10 TABLET ORAL EVERY 4 HOURS
Refills: 0 | Status: DISCONTINUED | OUTPATIENT
Start: 2020-07-17 | End: 2020-07-19

## 2020-07-17 RX ORDER — OXYCODONE HYDROCHLORIDE 5 MG/1
5 TABLET ORAL ONCE
Refills: 0 | Status: DISCONTINUED | OUTPATIENT
Start: 2020-07-17 | End: 2020-07-17

## 2020-07-17 RX ORDER — ONDANSETRON 8 MG/1
4 TABLET, FILM COATED ORAL ONCE
Refills: 0 | Status: COMPLETED | OUTPATIENT
Start: 2020-07-17 | End: 2020-07-17

## 2020-07-17 RX ORDER — SODIUM CHLORIDE 9 MG/ML
1000 INJECTION, SOLUTION INTRAVENOUS
Refills: 0 | Status: DISCONTINUED | OUTPATIENT
Start: 2020-07-17 | End: 2020-07-18

## 2020-07-17 RX ORDER — ACETAMINOPHEN 500 MG
650 TABLET ORAL EVERY 6 HOURS
Refills: 0 | Status: DISCONTINUED | OUTPATIENT
Start: 2020-07-17 | End: 2020-07-19

## 2020-07-17 RX ORDER — SODIUM CHLORIDE 9 MG/ML
3 INJECTION INTRAMUSCULAR; INTRAVENOUS; SUBCUTANEOUS EVERY 8 HOURS
Refills: 0 | Status: DISCONTINUED | OUTPATIENT
Start: 2020-07-17 | End: 2020-07-17

## 2020-07-17 RX ORDER — SODIUM CHLORIDE 9 MG/ML
1000 INJECTION, SOLUTION INTRAVENOUS
Refills: 0 | Status: DISCONTINUED | OUTPATIENT
Start: 2020-07-17 | End: 2020-07-17

## 2020-07-17 RX ORDER — SENNA PLUS 8.6 MG/1
2 TABLET ORAL AT BEDTIME
Refills: 0 | Status: DISCONTINUED | OUTPATIENT
Start: 2020-07-17 | End: 2020-07-19

## 2020-07-17 RX ORDER — ENOXAPARIN SODIUM 100 MG/ML
40 INJECTION SUBCUTANEOUS DAILY
Refills: 0 | Status: DISCONTINUED | OUTPATIENT
Start: 2020-07-17 | End: 2020-07-19

## 2020-07-17 RX ORDER — ONDANSETRON 8 MG/1
4 TABLET, FILM COATED ORAL EVERY 6 HOURS
Refills: 0 | Status: DISCONTINUED | OUTPATIENT
Start: 2020-07-17 | End: 2020-07-19

## 2020-07-17 RX ORDER — OXYCODONE HYDROCHLORIDE 5 MG/1
5 TABLET ORAL EVERY 4 HOURS
Refills: 0 | Status: DISCONTINUED | OUTPATIENT
Start: 2020-07-17 | End: 2020-07-19

## 2020-07-17 RX ORDER — HYDROMORPHONE HYDROCHLORIDE 2 MG/ML
0.5 INJECTION INTRAMUSCULAR; INTRAVENOUS; SUBCUTANEOUS
Refills: 0 | Status: DISCONTINUED | OUTPATIENT
Start: 2020-07-17 | End: 2020-07-17

## 2020-07-17 RX ORDER — KETOROLAC TROMETHAMINE 30 MG/ML
15 SYRINGE (ML) INJECTION EVERY 6 HOURS
Refills: 0 | Status: DISCONTINUED | OUTPATIENT
Start: 2020-07-17 | End: 2020-07-19

## 2020-07-17 RX ADMIN — ENOXAPARIN SODIUM 40 MILLIGRAM(S): 100 INJECTION SUBCUTANEOUS at 17:34

## 2020-07-17 RX ADMIN — ONDANSETRON 4 MILLIGRAM(S): 8 TABLET, FILM COATED ORAL at 18:50

## 2020-07-17 RX ADMIN — Medication 15 MILLIGRAM(S): at 17:34

## 2020-07-17 RX ADMIN — Medication 15 MILLIGRAM(S): at 23:42

## 2020-07-17 RX ADMIN — OXYCODONE HYDROCHLORIDE 5 MILLIGRAM(S): 5 TABLET ORAL at 21:44

## 2020-07-17 RX ADMIN — SODIUM CHLORIDE 125 MILLILITER(S): 9 INJECTION, SOLUTION INTRAVENOUS at 13:05

## 2020-07-17 RX ADMIN — ONDANSETRON 4 MILLIGRAM(S): 8 TABLET, FILM COATED ORAL at 16:22

## 2020-07-17 RX ADMIN — OXYCODONE HYDROCHLORIDE 5 MILLIGRAM(S): 5 TABLET ORAL at 20:59

## 2020-07-17 NOTE — PROGRESS NOTE ADULT - SUBJECTIVE AND OBJECTIVE BOX
Note    Post op Check    s/p : RALP    Pt seen / examined without complaints pain controlled    Vital Signs Last 24 Hrs  T(C): 36.5 (17 Jul 2020 12:15), Max: 36.8 (17 Jul 2020 05:56)  T(F): 97.7 (17 Jul 2020 12:15), Max: 98.3 (17 Jul 2020 05:56)  HR: 53 (17 Jul 2020 14:45) (53 - 75)  BP: 117/66 (17 Jul 2020 14:45) (99/44 - 125/66)  BP(mean): 77 (17 Jul 2020 14:45) (57 - 81)  RR: 20 (17 Jul 2020 14:45) (14 - 22)  SpO2: 95% (17 Jul 2020 14:45) (95% - 100%)    I&O's Summary    17 Jul 2020 07:01  -  17 Jul 2020 16:39  --------------------------------------------------------  IN: 125 mL / OUT: 120 mL / NET: 5 mL    EBL=50cc  Fol=200cc    PHYSICAL EXAM:       Constitutional: awake alert NAD    Respiratory: no resp distress    Cardiovascular: RRR    Gastrointestinal: soft, trocar sites CDI, Appropriately tender    Genitourinary: chen in place draining well; yellow    Extremities: + venodynes

## 2020-07-18 ENCOUNTER — TRANSCRIPTION ENCOUNTER (OUTPATIENT)
Age: 65
End: 2020-07-18

## 2020-07-18 RX ORDER — SENNA PLUS 8.6 MG/1
2 TABLET ORAL
Qty: 0 | Refills: 0 | DISCHARGE
Start: 2020-07-18

## 2020-07-18 RX ORDER — SODIUM CHLORIDE 9 MG/ML
1000 INJECTION, SOLUTION INTRAVENOUS
Refills: 0 | Status: DISCONTINUED | OUTPATIENT
Start: 2020-07-18 | End: 2020-07-19

## 2020-07-18 RX ORDER — ACETAMINOPHEN 500 MG
2 TABLET ORAL
Qty: 0 | Refills: 0 | DISCHARGE
Start: 2020-07-18

## 2020-07-18 RX ORDER — POLYETHYLENE GLYCOL 3350 17 G/17G
17 POWDER, FOR SOLUTION ORAL DAILY
Refills: 0 | Status: DISCONTINUED | OUTPATIENT
Start: 2020-07-18 | End: 2020-07-19

## 2020-07-18 RX ORDER — OXYCODONE HYDROCHLORIDE 5 MG/1
1 TABLET ORAL
Qty: 8 | Refills: 0
Start: 2020-07-18

## 2020-07-18 RX ORDER — POLYETHYLENE GLYCOL 3350 17 G/17G
17 POWDER, FOR SOLUTION ORAL
Qty: 0 | Refills: 0 | DISCHARGE
Start: 2020-07-18

## 2020-07-18 RX ADMIN — POLYETHYLENE GLYCOL 3350 17 GRAM(S): 17 POWDER, FOR SOLUTION ORAL at 12:17

## 2020-07-18 RX ADMIN — Medication 15 MILLIGRAM(S): at 12:17

## 2020-07-18 RX ADMIN — Medication 650 MILLIGRAM(S): at 18:00

## 2020-07-18 RX ADMIN — SODIUM CHLORIDE 75 MILLILITER(S): 9 INJECTION, SOLUTION INTRAVENOUS at 12:17

## 2020-07-18 RX ADMIN — Medication 650 MILLIGRAM(S): at 18:50

## 2020-07-18 RX ADMIN — ENOXAPARIN SODIUM 40 MILLIGRAM(S): 100 INJECTION SUBCUTANEOUS at 12:17

## 2020-07-18 RX ADMIN — Medication 15 MILLIGRAM(S): at 23:58

## 2020-07-18 RX ADMIN — Medication 15 MILLIGRAM(S): at 17:59

## 2020-07-18 RX ADMIN — Medication 15 MILLIGRAM(S): at 05:28

## 2020-07-18 RX ADMIN — Medication 15 MILLIGRAM(S): at 12:35

## 2020-07-18 RX ADMIN — Medication 15 MILLIGRAM(S): at 18:20

## 2020-07-18 NOTE — DISCHARGE NOTE PROVIDER - CARE PROVIDER_API CALL
Juan Alberto Valiente  UROLOGY  [address] [place] [id number]  Phone: [phone]  Fax: [phone]  Follow Up Time:

## 2020-07-18 NOTE — DISCHARGE NOTE PROVIDER - NSDCCPCAREPLAN_GEN_ALL_CORE_FT
PRINCIPAL DISCHARGE DIAGNOSIS  Diagnosis: Malignant neoplasm of prostate  Assessment and Plan of Treatment: Empty chen bag as needed as instructed.  Drink plenty of fluids.  No heavy lifting or straining for 4 to 6 weeks, avoid constipation. You may have intermittent pink tinged urine and small amounts of leakage around chen (due to bladder spasms).  This is normal.   If your urine becomes bright red or with clots, or there is no urine in the bag, please call the office. You may shower, just pat white strips dry, they will fall off in a few weeks.   Call Dr. Valiente's office to schedule a follow up appointment next week for chen removal and further management.  Call the office if you have fever greater than 101, no urine in bag, pain not relieved with pain medication, nausea/vomiting.

## 2020-07-18 NOTE — PROGRESS NOTE ADULT - SUBJECTIVE AND OBJECTIVE BOX
Subjective    Feeling well, pain controlled.   OOB yesterday/.  Tolerating CLD, no flatus or BM.      Objective    Vital signs  T(F): , Max: 98.2 (07-18-20 @ 01:18)  HR: 55 (07-18-20 @ 05:27)  BP: 113/60 (07-18-20 @ 05:27)  SpO2: 100% (07-18-20 @ 05:27)  Wt(kg): --    Output     OUT:    Indwelling Catheter - Urethral: 920 mL  Total OUT: 920 mL    Total NET: -920 mL          Physical Exam  Gen NAD  Abd soft NT ND   chen  clear yellow    Labs

## 2020-07-18 NOTE — DISCHARGE NOTE PROVIDER - NSDCMRMEDTOKEN_GEN_ALL_CORE_FT
acetaminophen 325 mg oral tablet: 3 tablets every 6 hours as needed for mild to moderate pain  polyethylene glycol 3350 oral powder for reconstitution: 17 gram(s) orally once a day  senna oral tablet: 2 tab(s) orally once a day (at bedtime), As needed, Constipation acetaminophen 325 mg oral tablet: 3 tablets every 6 hours as needed for mild to moderate pain  ibuprofen 600 mg oral tablet: 1 tab(s) orally every 6 hours, As Needed -for severe pain   polyethylene glycol 3350 oral powder for reconstitution: 17 gram(s) orally once a day  senna oral tablet: 2 tab(s) orally once a day (at bedtime), As needed, Constipation

## 2020-07-19 ENCOUNTER — TRANSCRIPTION ENCOUNTER (OUTPATIENT)
Age: 65
End: 2020-07-19

## 2020-07-19 VITALS
TEMPERATURE: 99 F | SYSTOLIC BLOOD PRESSURE: 119 MMHG | OXYGEN SATURATION: 100 % | DIASTOLIC BLOOD PRESSURE: 63 MMHG | HEART RATE: 58 BPM | RESPIRATION RATE: 17 BRPM

## 2020-07-19 RX ORDER — IBUPROFEN 200 MG
1 TABLET ORAL
Qty: 12 | Refills: 0
Start: 2020-07-19 | End: 2020-07-21

## 2020-07-19 RX ADMIN — Medication 30 MILLILITER(S): at 05:17

## 2020-07-19 RX ADMIN — Medication 650 MILLIGRAM(S): at 12:19

## 2020-07-19 RX ADMIN — Medication 15 MILLIGRAM(S): at 05:17

## 2020-07-19 NOTE — DISCHARGE NOTE NURSING/CASE MANAGEMENT/SOCIAL WORK - PATIENT PORTAL LINK FT
You can access the FollowMyHealth Patient Portal offered by  by registering at the following website: [email or web address]. By joining [facility]’s FollowMyHealth portal, you will also be able to view your health information using other applications (apps) compatible with our system.

## 2020-07-19 NOTE — DISCHARGE NOTE NURSING/CASE MANAGEMENT/SOCIAL WORK - NSDCPNINST_GEN_ALL_CORE
Take medication as prescribed. If pain is not relieved by medication contact your provider. Contact your provider if you have a fever.

## 2020-07-19 NOTE — PROGRESS NOTE ADULT - SUBJECTIVE AND OBJECTIVE BOX
Urology Daily Progress Note    SUBJECTIVE:  Pt seen and examined, and is resting comfortably in bed. No acute events overnight. Pain is adequately controlled on current regimen. Pt has no complaints at this time. Positive for flatus and negative for BM. Tolerating a regular diet.     OBJECTIVE:  Vital Signs Last 24 Hrs  T(C): 36.6 (19 Jul 2020 05:15), Max: 37.3 (18 Jul 2020 09:14)  T(F): 97.8 (19 Jul 2020 05:15), Max: 99.2 (18 Jul 2020 09:14)  HR: 54 (19 Jul 2020 05:15) (53 - 80)  BP: 123/63 (19 Jul 2020 05:15) (112/56 - 142/54)  BP(mean): --  RR: 18 (19 Jul 2020 05:15) (17 - 18)  SpO2: 99% (19 Jul 2020 05:15) (99% - 100%)    I&O's Detail    18 Jul 2020 07:01  -  19 Jul 2020 07:00  --------------------------------------------------------  IN:  Total IN: 0 mL    OUT:    Indwelling Catheter - Urethral: 3450 mL    Voided: 475 mL  Total OUT: 3925 mL    Total NET: -3925 mL        Exam:  GEN: A&Ox3, NAD  HEENT: atraumatic, normocephalic  RESP: no increased work of breathing, no use of accessory muscles  GI/ABD: soft, NT, ND, incision c/d/i  : Pierce with clear urine  EXTREMITIES: warm, pink, well-perfused

## 2020-07-28 ENCOUNTER — APPOINTMENT (OUTPATIENT)
Dept: UROLOGY | Facility: CLINIC | Age: 65
End: 2020-07-28
Payer: MEDICAID

## 2020-07-28 VITALS
SYSTOLIC BLOOD PRESSURE: 121 MMHG | DIASTOLIC BLOOD PRESSURE: 78 MMHG | WEIGHT: 140 LBS | BODY MASS INDEX: 19.6 KG/M2 | HEART RATE: 74 BPM | RESPIRATION RATE: 17 BRPM | HEIGHT: 71 IN

## 2020-07-28 VITALS — TEMPERATURE: 98 F

## 2020-07-28 DIAGNOSIS — Z01.818 ENCOUNTER FOR OTHER PREPROCEDURAL EXAMINATION: ICD-10-CM

## 2020-07-28 DIAGNOSIS — Z87.438 PERSONAL HISTORY OF OTHER DISEASES OF MALE GENITAL ORGANS: ICD-10-CM

## 2020-07-28 DIAGNOSIS — R10.2 PELVIC AND PERINEAL PAIN: ICD-10-CM

## 2020-07-28 PROBLEM — C61 MALIGNANT NEOPLASM OF PROSTATE: Chronic | Status: ACTIVE | Noted: 2020-07-10

## 2020-07-28 PROCEDURE — 99024 POSTOP FOLLOW-UP VISIT: CPT

## 2020-07-31 NOTE — ASSESSMENT
[FreeTextEntry1] : Pierce catheter removed.\par Kegels exercises reviewed.\par STARS pelvic floor PT\par Follow up in 6 weeks.

## 2020-07-31 NOTE — HISTORY OF PRESENT ILLNESS
[FreeTextEntry1] : s/p Robotic RP\par Path: Gl 7 (3+4), pT2Nx.\par No complications.\par \par Doing well. Urine clear.\par Tolerating regular diet, ambulating w/o difficulty.

## 2020-07-31 NOTE — PHYSICAL EXAM
[General Appearance - Well Developed] : well developed [General Appearance - Well Nourished] : well nourished [Normal Appearance] : normal appearance [Well Groomed] : well groomed [General Appearance - In No Acute Distress] : no acute distress [Abdomen Soft] : soft [Costovertebral Angle Tenderness] : no ~M costovertebral angle tenderness [Abdomen Tenderness] : non-tender [FreeTextEntry1] : Incision well healed

## 2020-09-03 ENCOUNTER — APPOINTMENT (OUTPATIENT)
Dept: UROLOGY | Facility: CLINIC | Age: 65
End: 2020-09-03
Payer: MEDICAID

## 2020-09-03 VITALS — TEMPERATURE: 98 F

## 2020-09-03 PROCEDURE — 99024 POSTOP FOLLOW-UP VISIT: CPT

## 2020-09-10 NOTE — HISTORY OF PRESENT ILLNESS
[FreeTextEntry1] : s/p Robotic RP 7/17/2020\par Path: Gl 7 (3+4), pT2Nx.\par No complications.\par \par Doing well.  Stress incontinence almost resolved.\par Wears one pad per day.  Good flow, complete emptying.\par No other complaints.

## 2020-09-10 NOTE — PHYSICAL EXAM
[General Appearance - Well Nourished] : well nourished [Well Groomed] : well groomed [General Appearance - Well Developed] : well developed [Normal Appearance] : normal appearance [Abdomen Tenderness] : non-tender [General Appearance - In No Acute Distress] : no acute distress [Abdomen Soft] : soft [Costovertebral Angle Tenderness] : no ~M costovertebral angle tenderness [Urinary Bladder Findings] : the bladder was normal on palpation

## 2020-09-11 LAB — PSA SERPL-MCNC: <0.01 NG/ML

## 2020-12-09 ENCOUNTER — APPOINTMENT (OUTPATIENT)
Dept: UROLOGY | Facility: CLINIC | Age: 65
End: 2020-12-09
Payer: MEDICAID

## 2020-12-09 VITALS — SYSTOLIC BLOOD PRESSURE: 110 MMHG | DIASTOLIC BLOOD PRESSURE: 67 MMHG | HEART RATE: 80 BPM | TEMPERATURE: 97 F

## 2020-12-09 PROCEDURE — 99213 OFFICE O/P EST LOW 20 MIN: CPT

## 2020-12-09 PROCEDURE — 99072 ADDL SUPL MATRL&STAF TM PHE: CPT

## 2020-12-09 NOTE — HISTORY OF PRESENT ILLNESS
[FreeTextEntry1] : Presents to the office for follow up.\par S/P robotic RP, PLND July 17th 2020\par PATH: pT2Nx, [place] 3+4\par \par Wears one pad a day, damp sometimes at the end of the day\par Nocturia x 3 \par Wakes up dry \par Denies any hematuria, dysuria or incontinence. Denies any increased urgency or frequency\par Not having any erections at this time. He is not interested in starting medications.

## 2020-12-14 LAB — PSA SERPL-MCNC: <0.01 NG/ML

## 2021-04-12 ENCOUNTER — NON-APPOINTMENT (OUTPATIENT)
Age: 66
End: 2021-04-12

## 2021-06-09 ENCOUNTER — APPOINTMENT (OUTPATIENT)
Dept: UROLOGY | Facility: CLINIC | Age: 66
End: 2021-06-09
Payer: MEDICAID

## 2021-06-09 VITALS
HEART RATE: 85 BPM | RESPIRATION RATE: 17 BRPM | SYSTOLIC BLOOD PRESSURE: 103 MMHG | DIASTOLIC BLOOD PRESSURE: 68 MMHG | TEMPERATURE: 98 F

## 2021-06-09 PROCEDURE — 99213 OFFICE O/P EST LOW 20 MIN: CPT

## 2021-06-10 LAB — PSA SERPL-MCNC: <0.01 NG/ML

## 2021-06-14 LAB
TESTOST BND SERPL-MCNC: 6.5 PG/ML
TESTOST SERPL-MCNC: 392.1 NG/DL

## 2021-06-21 NOTE — DISEASE MANAGEMENT
[1] : T1 [b] : b [6] : Mehama Score 6 [I] : I [Active Surveillance] : Active Surveillance [Patient had a radical prostatectomy] : Patient had a radical prostatectomy  [7(3+4)] : Ladonna Score 7(3+4) [Negative] : Negative margins [2] : T2 [X] : MX [BiopsyDate] : 8/18 [MeasuredProstateVolume] : 23 [TotalCores] : 12 [TotalPositiveCores] : 2 [MaxCoreInvolvement] : 80 [LastMRIDate] : 10/19 [LastMRIResults] : 2 PiRAD 3 lesions [FirstSurveillanceBiopsyDate] : 10/19 [FirstSurveillanceBiopsyResults] : Grade 2 in Target #1 [RadicalProstatectomyDate] : 7/17/2020 [TotalNumberofnodesresected] : 0 [PositiveNodes] : 0

## 2021-06-21 NOTE — PHYSICAL EXAM
[FreeTextEntry1] : Constitutional:  Well developed, normal appearance, no acute distress\par Cardiovascular:  Heart rate and rhythm were normal, no peripheral edema\par Pulmonary:  No respiratory distress, normal respiratory rhythm and effort, no accessory muscle use\par Abdomen: Soft, non tender, non distended, no costovertebral angle tenderness\par Musculoskeletal:  Gait and station were normal, no palpable tenderness over the spine or axial skeleton\par Skin:  Normal supple, no rashes\par Neurological:  no focal sensory or motor defects\par Lymphatics: no palpable adenopathy, no inguinal or femoral adenopathy\par

## 2021-06-21 NOTE — HISTORY OF PRESENT ILLNESS
[FreeTextEntry1] : Here for routine follow up.\par Almost one year post radical prostatectomy.\par \par Urinary function:  minimal stress incontinence, usually no pads.\par \par Erectile function: continues to have erectile dysfunction.\par \par Last PSA 12/9/2020: <0.01 ng/mL

## 2021-07-02 ENCOUNTER — APPOINTMENT (OUTPATIENT)
Dept: UROLOGY | Facility: CLINIC | Age: 66
End: 2021-07-02
Payer: MEDICAID

## 2021-07-02 VITALS
SYSTOLIC BLOOD PRESSURE: 129 MMHG | HEIGHT: 71 IN | RESPIRATION RATE: 16 BRPM | WEIGHT: 145 LBS | DIASTOLIC BLOOD PRESSURE: 76 MMHG | HEART RATE: 72 BPM | OXYGEN SATURATION: 99 % | BODY MASS INDEX: 20.3 KG/M2

## 2021-07-02 PROCEDURE — 99214 OFFICE O/P EST MOD 30 MIN: CPT

## 2021-07-02 PROCEDURE — 36415 COLL VENOUS BLD VENIPUNCTURE: CPT

## 2021-07-02 NOTE — ASSESSMENT
[FreeTextEntry1] : ED after ACAP\par \par free T was low \par check PRL as well \par \par Following was discussed with the patient: \par \par Erectile dysfunction (ED) erectile dysfunction can be multifactorial in nature. Psychogenic erectile dysfunction from stress, anxiety, sexual inexperience, fear of being used, fear of pregnancy, fear of STDs, or due to mental disease is more common in younger men, but can be present in men of all ages. It typically respond to combination of medical management and behavioral therapy. Interesting patients with ED after prostatectomy may suffer from component of psychogenic ED due to incontinence and sense of embarrassment,  body image disorder when they feel altered, inadequate due to lack of seminal fluid expulsion. Men with urinary incontinence will withdraw from sexual activities because of shame and fear of urinating inside of their partners.  I reviewed change in behavior like emptying the bladder prior to sexual activities, using condom and penile/scrotal ring to minimize urine leakage. \par \par The vasculogenic ED specifically arteriogenic ED is more prevalent in patients with coronary artery disease, obesity, poorly controlled hypertension, diabetes mellitus, and hypercholesterolemia.  Importance of medical management of risk factors for arteriogenic ED was discussed. Venous leak is seen after prostatectomy and radiation therapy. Onset of radiation therapy related ED is typically delayed but venous leak is most common. Presence of penile scarring (Peyronie's disease) increases risk of venous leak. \par \par Evaluation of ED with physical exam, hormonal evaluation, and penile Doppler ultrasound (DUS) was discussed. \par \par Sexual rehabilitation therapy after prostatectomy was discussed with patient. Literature indicates that recovery of erectile function after prostatectomy may take 2 to 3 years and little improvements are noticed after that period however most data refer to patients being actively treated for their ED during this period. \par \par In patients with delayed initiation of treatment because incontinence or other factors - recovery of function may still significantly improve as most of them have not had systemic therapy for longer time. \par \par Step wise approach with escalating oral therapy and option of penile intracavernosal injection therapy was discussed. \par \par Risks, side effects, benefits, appropriate use in relation to food intake, need to stimulate/be aroused to measure response, and cost of therapy with oral medications was discussed. \par \par Risks and benefits of intracavernosal injection therapy were discussed. \par \par We will reassess response to therapy during follow up and adjust as necessary\par \par Start oral medications if not better in 4 to 6 weeks then DUS and injection teaching \par

## 2021-07-02 NOTE — HISTORY OF PRESENT ILLNESS
[FreeTextEntry1] : ED \par Had robotic assisted prostatectomy in 2020 \par erections before surgery were fair \par used to have surgery on the right side of scrotum \par no pain since then \par \par erection before surgery was no strong but was not taking any medications\par \par his son recently  from metastatic lung cancer \par \par is getting some erection but is weak \par not enough to penetration \par \par never tried to take any medications for ED\par \par

## 2021-07-02 NOTE — PHYSICAL EXAM
[General Appearance - Well Developed] : well developed [General Appearance - Well Nourished] : well nourished [Normal Appearance] : normal appearance [Well Groomed] : well groomed [General Appearance - In No Acute Distress] : no acute distress [Abdomen Soft] : soft [Abdomen Tenderness] : non-tender [Costovertebral Angle Tenderness] : no ~M costovertebral angle tenderness [Urethral Meatus] : meatus normal [Urinary Bladder Findings] : the bladder was normal on palpation [Penis Abnormality] : normal uncircumcised penis [Testes Mass (___cm)] : there were no testicular masses [Scrotum] : the scrotum was normal [FreeTextEntry1] : both testes are soft, no masses,  [Edema] : no peripheral edema [] : no respiratory distress [Respiration, Rhythm And Depth] : normal respiratory rhythm and effort [Exaggerated Use Of Accessory Muscles For Inspiration] : no accessory muscle use [Oriented To Time, Place, And Person] : oriented to person, place, and time [Affect] : the affect was normal [Mood] : the mood was normal [Not Anxious] : not anxious [Normal Station and Gait] : the gait and station were normal for the patient's age [No Focal Deficits] : no focal deficits [No Palpable Adenopathy] : no palpable adenopathy

## 2021-07-06 ENCOUNTER — RX CHANGE (OUTPATIENT)
Age: 66
End: 2021-07-06

## 2021-07-08 ENCOUNTER — NON-APPOINTMENT (OUTPATIENT)
Age: 66
End: 2021-07-08

## 2021-07-30 ENCOUNTER — APPOINTMENT (OUTPATIENT)
Dept: UROLOGY | Facility: CLINIC | Age: 66
End: 2021-07-30
Payer: MEDICAID

## 2021-07-30 VITALS
HEART RATE: 60 BPM | RESPIRATION RATE: 16 BRPM | SYSTOLIC BLOOD PRESSURE: 105 MMHG | DIASTOLIC BLOOD PRESSURE: 61 MMHG | OXYGEN SATURATION: 99 %

## 2021-07-30 PROCEDURE — 99214 OFFICE O/P EST MOD 30 MIN: CPT

## 2021-07-30 NOTE — HISTORY OF PRESENT ILLNESS
[FreeTextEntry1] : JUNIOR AGUAYO, a 66 year old male, presented to the office for a follow up regarding his erectile dysfunction.\par \par Just filled script Saturday \par - didn't fill previously because of cost. used [facility] and filled the script and started taking the med.\par \par Only has taken 2 doses. \par -hasn't noticed any change.\par \par

## 2021-09-07 ENCOUNTER — APPOINTMENT (OUTPATIENT)
Dept: UROLOGY | Facility: CLINIC | Age: 66
End: 2021-09-07
Payer: MEDICAID

## 2021-09-07 VITALS — SYSTOLIC BLOOD PRESSURE: 102 MMHG | TEMPERATURE: 97.7 F | HEART RATE: 59 BPM | DIASTOLIC BLOOD PRESSURE: 65 MMHG

## 2021-09-07 DIAGNOSIS — R79.89 OTHER SPECIFIED ABNORMAL FINDINGS OF BLOOD CHEMISTRY: ICD-10-CM

## 2021-09-07 PROCEDURE — 54235 NJX CORPORA CAVERNOSA RX AGT: CPT

## 2021-09-07 PROCEDURE — 93980 PENILE VASCULAR STUDY: CPT

## 2021-09-07 PROCEDURE — 99214 OFFICE O/P EST MOD 30 MIN: CPT | Mod: 25

## 2021-09-07 NOTE — ASSESSMENT
[FreeTextEntry1] : ED post prostatectomy \par \par responded to injection \par continues to have urinary incontinence \par mild \par \par will start with 25 units of trimix \par refilled send \par injection teaching today \par if not better than IPP but will need to control urinary issues better\par \par \par Intracavernosal penile injection teaching.\par Patient is here for the penile injection teaching.  He failed oral therapy for erectile dysfunction.\par \par We reviewed risks, benefits, alternatives, complications and the procedure itself.\par \par Specifically we discussed with the patient that the medication used for intracavernosal injection therapy consists of 3 or 4 different medication, specifically prostaglandin E1, papaverine, phentolamine, and occasionally very low dose of atropine.  The medication for penile injection therapy is prepared by the pharmacy.  Only special pharmacy is prepared at medication.  Each of the medication dilates vessels in the penis using different mechanisms.  Only one of the components of that preparation is approved by FDA for penile injection therapy however it has to be used in higher dose which can increase pain and intracavernosal scarring.  The approved component of the Trimix is prostaglandin E1.  It is also known as Caverject.\par \par The medication is injected by the patient in the shaft of the penis on the left or right side.  The appropriate site of the infection injection was demonstrated on the model of the penis.  Patient should not inject into the dorsal aspect of the penis or underneath the penis as he may inject into the vessels or urethra.\par \par The risk of prolonged correction, also numbness priapism was explained to the patient.  If patient has erection more than 2 hours he should take from 2-4 Sudafed's of 30 mg each total 120 mg.  Maximum dose of Sudafed per days 240 mg.  If the erection pressure persists still after Sudafed he needs to contact our office within working hours or go to the nearest ER.\par \par Patient should never have erection longer than 4 hours.  Irreversible damage to the penis can occur if priapism persists.  Patient understood the risk of priapism.\par \par Prescription was given.  Prescription for syringes was given as well.  Patient will contact us with any questions.  \par \par Total time spend teaching the patient 35 min

## 2021-09-07 NOTE — HISTORY OF PRESENT ILLNESS
[FreeTextEntry1] : Here for follow up of ED\par didn’t respond to oral medications \par \par prostate cancer\par s/p prostatectomy \par \par seen by cardiologist prior surgery \par \par

## 2021-12-07 ENCOUNTER — APPOINTMENT (OUTPATIENT)
Dept: UROLOGY | Facility: CLINIC | Age: 66
End: 2021-12-07
Payer: MEDICAID

## 2021-12-07 VITALS
DIASTOLIC BLOOD PRESSURE: 69 MMHG | SYSTOLIC BLOOD PRESSURE: 105 MMHG | BODY MASS INDEX: 20.3 KG/M2 | HEIGHT: 71 IN | WEIGHT: 145 LBS | HEART RATE: 61 BPM

## 2021-12-07 PROCEDURE — 99213 OFFICE O/P EST LOW 20 MIN: CPT

## 2021-12-13 ENCOUNTER — APPOINTMENT (OUTPATIENT)
Dept: UROLOGY | Facility: CLINIC | Age: 66
End: 2021-12-13
Payer: MEDICAID

## 2021-12-13 PROCEDURE — 99213 OFFICE O/P EST LOW 20 MIN: CPT

## 2021-12-13 NOTE — DISEASE MANAGEMENT
[1] : T1 [b] : b [Biopsy] : Patient had a biopsy on [6] : Template Biopsy Ladonna Score: 6 [I] : I [Active Surveillance] : Active Surveillance [Radical Prostatectomy] : Migrate  from Active Surveillance  to Radical Prostatectomy [Patient had a radical prostatectomy] : Patient had a radical prostatectomy  [7(3+4)] : Ladonna Score 7(3+4) [Negative] : Negative margins [2] : T2 [X] : MX [] : Patient had no Bone Scan performed [BiopsyDate] : 8/29/2018 [MeasuredProstateVolume] : 23 [TotalCores] : 12 [TotalPositiveCores] : 2 [MaxCoreInvolvement] : 80 [LastMRIDate] : 10/8/2019 [LastMRIResults] : 2 PiRAD 3 lesions [FirstSurveillanceBiopsyDate] : 10/28/2019 [FirstSurveillanceBiopsyResults] : Grade 2 in Target #1 [TotalNumberofnodesresected] : 0 [RadicalProstatectomyDate] : 7/17/2020 [PositiveNodes] : 0

## 2021-12-13 NOTE — PHYSICAL EXAM
[FreeTextEntry1] : Gen:  No apparent distress\par Abdomen: soft, non tender, non distended.  \par Incision: clean and intact.  \par :  normal bladder. \par Ext: no edema.  \par Neuro: Normal gait, normal LE strength.\par \par

## 2021-12-13 NOTE — HISTORY OF PRESENT ILLNESS
[FreeTextEntry1] : Here for 6 month follow up.\par Last PSA undetectable 6/9/2021\par \par ED: seeing Dr. Moore.  Had pre-surgical ED. Currently on Cialis 20 mg.  Not using TriMix injections.  \par \par Urinary function: Still using one Depends a day. Patient states he remains mostly dry, but occasionally has some leakage of urine with activity.

## 2021-12-15 LAB — PSA SERPL-MCNC: <0.01 NG/ML

## 2022-03-01 ENCOUNTER — APPOINTMENT (OUTPATIENT)
Dept: CARDIOLOGY | Facility: CLINIC | Age: 67
End: 2022-03-01
Payer: MEDICAID

## 2022-03-01 ENCOUNTER — NON-APPOINTMENT (OUTPATIENT)
Age: 67
End: 2022-03-01

## 2022-03-01 VITALS
HEIGHT: 71 IN | SYSTOLIC BLOOD PRESSURE: 110 MMHG | TEMPERATURE: 97.4 F | HEART RATE: 59 BPM | WEIGHT: 148 LBS | DIASTOLIC BLOOD PRESSURE: 60 MMHG | OXYGEN SATURATION: 97 % | BODY MASS INDEX: 20.72 KG/M2

## 2022-03-01 PROCEDURE — 99213 OFFICE O/P EST LOW 20 MIN: CPT

## 2022-03-01 PROCEDURE — 93000 ELECTROCARDIOGRAM COMPLETE: CPT

## 2022-03-01 RX ORDER — CALCIUM CARB/VITAMIN D3/VIT K1 500-100-40
31G X 5/16" TABLET,CHEWABLE ORAL
Qty: 50 | Refills: 4 | Status: DISCONTINUED | COMMUNITY
Start: 2021-09-07 | End: 2022-03-01

## 2022-03-01 NOTE — CARDIOLOGY SUMMARY
[___] : [unfilled] [LVEF ___%] : LVEF [unfilled]% [de-identified] : 3/1/22, Sinus  Rhythm  -With rate variation \par -Poor R-wave progression -nonspecific -consider old anterior infarct.  [de-identified] : 5/7/14, normal ex stress test

## 2022-03-01 NOTE — DISCUSSION/SUMMARY
[___ Year(s)] : in [unfilled] year(s) [FreeTextEntry1] : Labs done 7/10/20 were reviewed and aside from mild anemia which appears chronic, were otherwise normal.\par \par ECG remains essentially unchanged.  Patient otherwise asymptomatic from cardiac standpoint and is never had any significant cardiac risk factors documented. (Borderline diabetes) No indication for further cardiac testing at this time.  Patient to follow-up in one year for surveillance.

## 2022-03-01 NOTE — HISTORY OF PRESENT ILLNESS
[FreeTextEntry1] : 66-year-old [facility] male with h/o EKG abnormalities noted on routine physical examination.\par Patient denies any chest discomfort, shortness of breath, palpitations. Was hospitalized in June, 2019 with GI bleed, no further recent bleed. Seen by Dr. Jacinto in followup.\par \par He states that he has no exercise intolerance but does not exercise routinely. He works as an . No other significant medical issues were noted.

## 2022-04-06 NOTE — HISTORY OF PRESENT ILLNESS
[FreeTextEntry1] : Here for follow up of ED\par had prostatectomy by dr. Valiente \par RP PLND 7/17/2020 \par \par not using injections because low sex drive and family issues\par wear diaper \par has injection at home if he needs to use is \par \par Cialis 20 mg \par helps for masturbation \par but gets headache \par

## 2022-04-06 NOTE — ASSESSMENT
[FreeTextEntry1] : ED\par \par Just started to take Tadalafil 20mg PO \par \par will break into 1/2 \par \par discussed ICI v IPP v continue oral medx \par \par does not want IPP \par \par difficult to do ICI \par \par will readdress during next visit \par

## 2022-04-29 ENCOUNTER — APPOINTMENT (OUTPATIENT)
Dept: ORTHOPEDIC SURGERY | Facility: CLINIC | Age: 67
End: 2022-04-29

## 2022-06-13 ENCOUNTER — APPOINTMENT (OUTPATIENT)
Dept: UROLOGY | Facility: CLINIC | Age: 67
End: 2022-06-13
Payer: COMMERCIAL

## 2022-06-13 VITALS
HEIGHT: 71 IN | BODY MASS INDEX: 20.72 KG/M2 | RESPIRATION RATE: 17 BRPM | TEMPERATURE: 97.2 F | HEART RATE: 45 BPM | SYSTOLIC BLOOD PRESSURE: 116 MMHG | DIASTOLIC BLOOD PRESSURE: 64 MMHG | WEIGHT: 148 LBS

## 2022-06-13 PROCEDURE — 99213 OFFICE O/P EST LOW 20 MIN: CPT

## 2022-06-14 LAB — PSA SERPL-MCNC: <0.01 NG/ML

## 2022-06-26 NOTE — HISTORY OF PRESENT ILLNESS
[FreeTextEntry1] : Urinary function: Has a small amount of leakage when he has the urge to go\par Wearing one pad a day, usually dry \par \par Erectile function: Was prescribed Trimix, did not use. \par \par No other significant changes in medical hx.

## 2022-06-26 NOTE — DISEASE MANAGEMENT
[1] : T1 [b] : b [Biopsy] : Patient had a biopsy on [6] : Template Biopsy Ladonna Score: 6 [I] : I [Active Surveillance] : Active Surveillance [Radical Prostatectomy] : Migrate  from Active Surveillance  to Radical Prostatectomy [Patient had a radical prostatectomy] : Patient had a radical prostatectomy  [7(3+4)] : Ladonna Score 7(3+4) [Negative] : Negative margins [2] : T2 [X] : MX [] : Patient had no Bone Scan performed [BiopsyDate] : 8/29/2018 [MeasuredProstateVolume] : 23 [TotalCores] : 12 [TotalPositiveCores] : 2 [MaxCoreInvolvement] : 80 [LastMRIDate] : 10/8/2019 [LastMRIResults] : 2 PiRAD 3 lesions [FirstSurveillanceBiopsyDate] : 10/28/2019 [FirstSurveillanceBiopsyResults] : Grade 2 in Target #1 [RadicalProstatectomyDate] : 7/17/2020 [TotalNumberofnodesresected] : 0 [PositiveNodes] : 0

## 2022-06-26 NOTE — PHYSICAL EXAM
[FreeTextEntry1] : Gen:  No apparent distress\par Abdomen: soft, non tender, non distended.  \par :  normal bladder. \par Ext: no edema.  \par Neuro: Normal gait, normal LE strength.\par \par

## 2022-08-06 LAB
ALBUMIN SERPL ELPH-MCNC: 4.5 G/DL
ALP BLD-CCNC: 98 U/L
ALT SERPL-CCNC: 12 U/L
ANION GAP SERPL CALC-SCNC: 11 MMOL/L
AST SERPL-CCNC: 17 U/L
BASOPHILS # BLD AUTO: 0.03 K/UL
BASOPHILS NFR BLD AUTO: 0.3 %
BILIRUB SERPL-MCNC: 0.2 MG/DL
BUN SERPL-MCNC: 11 MG/DL
CALCIUM SERPL-MCNC: 9.6 MG/DL
CHLORIDE SERPL-SCNC: 104 MMOL/L
CO2 SERPL-SCNC: 24 MMOL/L
CREAT SERPL-MCNC: 1.21 MG/DL
EOSINOPHIL # BLD AUTO: 0.03 K/UL
EOSINOPHIL NFR BLD AUTO: 0.3 %
ESTIMATED AVERAGE GLUCOSE: 123 MG/DL
GLUCOSE SERPL-MCNC: 101 MG/DL
HBA1C MFR BLD HPLC: 5.9 %
HCT VFR BLD CALC: 42.4 %
HGB BLD-MCNC: 13.4 G/DL
IMM GRANULOCYTES NFR BLD AUTO: 0.3 %
LYMPHOCYTES # BLD AUTO: 2.88 K/UL
LYMPHOCYTES NFR BLD AUTO: 33.6 %
MAN DIFF?: NORMAL
MCHC RBC-ENTMCNC: 27.2 PG
MCHC RBC-ENTMCNC: 31.6 GM/DL
MCV RBC AUTO: 86.2 FL
MONOCYTES # BLD AUTO: 0.47 K/UL
MONOCYTES NFR BLD AUTO: 5.5 %
NEUTROPHILS # BLD AUTO: 5.14 K/UL
NEUTROPHILS NFR BLD AUTO: 60 %
PLATELET # BLD AUTO: 263 K/UL
POTASSIUM SERPL-SCNC: 4.5 MMOL/L
PROT SERPL-MCNC: 6.7 G/DL
RBC # BLD: 4.92 M/UL
RBC # FLD: 14.6 %
SODIUM SERPL-SCNC: 139 MMOL/L
TESTOST BND SERPL-MCNC: 9.9 PG/ML
TESTOSTERONE TOTAL S: 450 NG/DL
WBC # FLD AUTO: 8.58 K/UL

## 2022-11-01 ENCOUNTER — APPOINTMENT (OUTPATIENT)
Dept: UROLOGY | Facility: CLINIC | Age: 67
End: 2022-11-01
Payer: COMMERCIAL

## 2022-11-01 VITALS
WEIGHT: 148 LBS | SYSTOLIC BLOOD PRESSURE: 98 MMHG | HEART RATE: 67 BPM | HEIGHT: 71 IN | BODY MASS INDEX: 20.72 KG/M2 | RESPIRATION RATE: 16 BRPM | DIASTOLIC BLOOD PRESSURE: 63 MMHG

## 2022-11-01 DIAGNOSIS — Z85.46 PERSONAL HISTORY OF MALIGNANT NEOPLASM OF PROSTATE: ICD-10-CM

## 2022-11-01 DIAGNOSIS — N52.31 ERECTILE DYSFUNCTION FOLLOWING RADICAL PROSTATECTOMY: ICD-10-CM

## 2022-11-01 PROCEDURE — 99213 OFFICE O/P EST LOW 20 MIN: CPT

## 2022-11-01 RX ORDER — PAPAVER/PHENTOLAMINE/ALPROSTAD 150-5-50
150-5-50 VIAL (EA) INTRACAVERNOSAL
Qty: 1 | Refills: 0 | Status: COMPLETED | COMMUNITY
Start: 2021-07-30 | End: 2022-11-01

## 2022-11-01 RX ORDER — TADALAFIL 20 MG/1
20 TABLET ORAL
Qty: 12 | Refills: 6 | Status: COMPLETED | OUTPATIENT
Start: 2021-07-02 | End: 2022-11-01

## 2022-11-01 NOTE — HISTORY OF PRESENT ILLNESS
[FreeTextEntry1] : Has complaints of nocturia, still has leakage at night. Wearing one Depends a day. Does not want to switch to pads because he wears boxers, does not want to wear briefs\par \par Primary complaint is persistent erectile dysfunction and climacturia.

## 2022-11-02 LAB
PSA SERPL-MCNC: <0.01 NG/ML
TESTOST SERPL-MCNC: 679 NG/DL

## 2022-11-07 RX ORDER — SILDENAFIL 50 MG/1
50 TABLET ORAL
Qty: 10 | Refills: 10 | Status: ACTIVE | COMMUNITY
Start: 2022-11-01 | End: 1900-01-01

## 2022-11-08 ENCOUNTER — NON-APPOINTMENT (OUTPATIENT)
Age: 67
End: 2022-11-08

## 2024-01-23 ENCOUNTER — APPOINTMENT (OUTPATIENT)
Dept: CARDIOLOGY | Facility: CLINIC | Age: 69
End: 2024-01-23
Payer: MEDICARE

## 2024-01-23 ENCOUNTER — NON-APPOINTMENT (OUTPATIENT)
Age: 69
End: 2024-01-23

## 2024-01-23 VITALS
HEIGHT: 71 IN | WEIGHT: 130 LBS | DIASTOLIC BLOOD PRESSURE: 60 MMHG | OXYGEN SATURATION: 98 % | HEART RATE: 57 BPM | SYSTOLIC BLOOD PRESSURE: 106 MMHG | BODY MASS INDEX: 18.2 KG/M2

## 2024-01-23 DIAGNOSIS — R09.89 OTHER SPECIFIED SYMPTOMS AND SIGNS INVOLVING THE CIRCULATORY AND RESPIRATORY SYSTEMS: ICD-10-CM

## 2024-01-23 DIAGNOSIS — R94.31 ABNORMAL ELECTROCARDIOGRAM [ECG] [EKG]: ICD-10-CM

## 2024-01-23 PROCEDURE — 93000 ELECTROCARDIOGRAM COMPLETE: CPT

## 2024-01-23 PROCEDURE — 99213 OFFICE O/P EST LOW 20 MIN: CPT | Mod: 25

## 2024-01-23 NOTE — HISTORY OF PRESENT ILLNESS
[FreeTextEntry1] : 68-year-old [facility] male with h/o EKG abnormalities noted on routine physical examination. Patient denies any chest discomfort, shortness of breath, palpitations. Was hospitalized in June, 2019 with GI bleed, no further recent bleed. Seen by Dr. Jacinto in followup.  He states that he has no exercise intolerance and has begun exercising routinely. He works as an . Denies any new symptoms other than cramping pains in his shins at times.

## 2024-01-23 NOTE — PHYSICAL EXAM
[Well Developed] : well developed [No Acute Distress] : no acute distress [Well Nourished] : well nourished [Normal Conjunctiva] : normal conjunctiva [Normal Venous Pressure] : normal venous pressure [No Carotid Bruit] : no carotid bruit [No Murmur] : no murmur [No Rub] : no rub [No Gallop] : no gallop [Clear Lung Fields] : clear lung fields [Good Air Entry] : good air entry [No Respiratory Distress] : no respiratory distress  [Soft] : abdomen soft [Non Tender] : non-tender [No Masses/organomegaly] : no masses/organomegaly [Normal Bowel Sounds] : normal bowel sounds [Normal Gait] : normal gait [No Edema] : no edema [No Cyanosis] : no cyanosis [No Clubbing] : no clubbing [No Varicosities] : no varicosities [Diminished Pedal Pulses ___] : diminished pedal pulses [unfilled] [No Rash] : no rash [No Skin Lesions] : no skin lesions [Moves all extremities] : moves all extremities [No Focal Deficits] : no focal deficits [Normal Speech] : normal speech [Alert and Oriented] : alert and oriented [Normal memory] : normal memory [de-identified] : diminished S1, normal S2

## 2024-01-23 NOTE — DISCUSSION/SUMMARY
[___ Year(s)] : in [unfilled] year(s) [FreeTextEntry1] : Labs done 11/23 were reviewed and were normal.  ECG remains essentially unchanged.   Decreased peripheral pulses in ex-smoker (still smoking marijuana) - will schedule TTE and COLETTE to screen for peripheral vascular disease. Patient to follow-up in one year for surveillance. [EKG obtained to assist in diagnosis and management of assessed problem(s)] : EKG obtained to assist in diagnosis and management of assessed problem(s)

## 2024-01-23 NOTE — CARDIOLOGY SUMMARY
[de-identified] : 1/23/24, Sinus Rhythm , -Poor R-wave progression -nonspecific -consider old anterior infarct. -Nonspecific T-abnormality. 3/1/22, Sinus  Rhythm  -With rate variation -Poor R-wave progression -nonspecific -consider old anterior infarct.  [de-identified] : 5/7/14, normal ex stress test [___] : [unfilled] [LVEF ___%] : LVEF [unfilled]%

## 2024-02-19 ENCOUNTER — APPOINTMENT (OUTPATIENT)
Dept: CARDIOLOGY | Facility: CLINIC | Age: 69
End: 2024-02-19
Payer: MEDICARE

## 2024-02-19 PROCEDURE — 93922 UPR/L XTREMITY ART 2 LEVELS: CPT

## 2024-02-19 PROCEDURE — 93306 TTE W/DOPPLER COMPLETE: CPT

## 2025-01-07 NOTE — PATIENT PROFILE ADULT - NSPROMUTPARTICIPCAREFT_GEN_A_NUR
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     Recurrent AF RVR in the setting of ADHF and NICO on CKD    -She was admitted in April 2024 with the same reason  -Outpatient TTE from 11/7/24 showed LVEF 55%, no RWMA, mod (grade 2) LVDD, severely dilated LA (RAUL: 85), mod-mod MR, mild-mod AR, mod plum HTN.   -She was doing well post DCCV with EF recovered and therefore amiodarone was discontinued in May 2024  -Outpatient event monitor from August 2024 showed 3% AF burden  -Pt scheduled for AV fistula placement on 1/16/25, need renal input regarding timing of dialysis    -CT chest showed LLL with 2cm irregular nodule, suspicious for neoplasm, needs to clarify if further procedures needs (ie needle biopsy)  -For now, would increase beta blocker as BP can tolerate for rate control.   -Eventual /DCCV once pt is optimized from HF standpoint and can be on un-interrupted AC for minimum of 30 days  -Plan discussed with primary team.     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     Recurrent AF RVR in the setting of ADHF and NICO on CKD    -She was admitted in April 2024 with the same reason  -Outpatient TTE from 11/7/24 showed LVEF 55%, no RWMA, mod (grade 2) LVDD, severely dilated LA (RAUL: 85), mod-mod MR, mild-mod AR, mod plum HTN.   -She was doing well post DCCV with EF recovered and therefore amiodarone was discontinued in May 2024  -Outpatient event monitor from August 2024 showed 3% AF burden  -Pt scheduled for AV fistula placement on 1/16/25, need renal input regarding timing of dialysis    -CT chest showed LLL with 2cm irregular nodule, suspicious for neoplasm, needs to clarify if further procedures needs (ie needle biopsy)  -For now, would increase beta blocker as BP can tolerate for rate control.   -Eventual DCCV once pt is optimized from HF standpoint and can be on un-interrupted AC for minimum of 30 days  -Plan discussed with primary team.     MAGDALENA Mata NP-C   [phone]   daughter or son

## 2025-01-28 ENCOUNTER — APPOINTMENT (OUTPATIENT)
Dept: CARDIOLOGY | Facility: CLINIC | Age: 70
End: 2025-01-28
Payer: MEDICARE

## 2025-01-28 ENCOUNTER — NON-APPOINTMENT (OUTPATIENT)
Age: 70
End: 2025-01-28

## 2025-01-28 VITALS
BODY MASS INDEX: 19.6 KG/M2 | OXYGEN SATURATION: 98 % | DIASTOLIC BLOOD PRESSURE: 60 MMHG | SYSTOLIC BLOOD PRESSURE: 108 MMHG | HEART RATE: 47 BPM | HEIGHT: 71 IN | WEIGHT: 140 LBS

## 2025-01-28 DIAGNOSIS — R53.1 WEAKNESS: ICD-10-CM

## 2025-01-28 DIAGNOSIS — R94.31 ABNORMAL ELECTROCARDIOGRAM [ECG] [EKG]: ICD-10-CM

## 2025-01-28 PROCEDURE — 99213 OFFICE O/P EST LOW 20 MIN: CPT | Mod: 25

## 2025-01-28 PROCEDURE — 93000 ELECTROCARDIOGRAM COMPLETE: CPT
